# Patient Record
Sex: FEMALE | Race: WHITE | Employment: FULL TIME | URBAN - METROPOLITAN AREA
[De-identification: names, ages, dates, MRNs, and addresses within clinical notes are randomized per-mention and may not be internally consistent; named-entity substitution may affect disease eponyms.]

---

## 2017-05-12 ENCOUNTER — TELEPHONE (OUTPATIENT)
Dept: FAMILY MEDICINE CLINIC | Age: 27
End: 2017-05-12

## 2017-05-12 NOTE — TELEPHONE ENCOUNTER
Chief Complaint   Patient presents with    Chest Pain     Received call from patient that she was experiencing chest pain. Patient denies any SOB. Pt instructed to go to the ER for a full evaluation. Pt verbalizes understanding.

## 2017-05-23 ENCOUNTER — HOSPITAL ENCOUNTER (OUTPATIENT)
Dept: CT IMAGING | Age: 27
Discharge: HOME | End: 2017-05-23
Attending: FAMILY MEDICINE
Payer: COMMERCIAL

## 2017-05-23 ENCOUNTER — TELEPHONE (OUTPATIENT)
Dept: FAMILY MEDICINE CLINIC | Age: 27
End: 2017-05-23

## 2017-05-23 ENCOUNTER — HOSPITAL ENCOUNTER (OUTPATIENT)
Dept: LAB | Age: 27
Discharge: HOME | End: 2017-05-23

## 2017-05-23 ENCOUNTER — HOSPITAL ENCOUNTER (OUTPATIENT)
Dept: GENERAL RADIOLOGY | Age: 27
Discharge: HOME | End: 2017-05-23
Payer: COMMERCIAL

## 2017-05-23 ENCOUNTER — OFFICE VISIT (OUTPATIENT)
Dept: FAMILY MEDICINE CLINIC | Age: 27
End: 2017-05-23

## 2017-05-23 ENCOUNTER — HOSPITAL ENCOUNTER (OUTPATIENT)
Dept: LAB | Age: 27
Discharge: HOME | End: 2017-05-23
Payer: COMMERCIAL

## 2017-05-23 VITALS
TEMPERATURE: 98.8 F | BODY MASS INDEX: 40.22 KG/M2 | RESPIRATION RATE: 16 BRPM | WEIGHT: 256.25 LBS | DIASTOLIC BLOOD PRESSURE: 89 MMHG | HEIGHT: 67 IN | SYSTOLIC BLOOD PRESSURE: 128 MMHG | HEART RATE: 81 BPM

## 2017-05-23 DIAGNOSIS — D72.829 LEUKOCYTOSIS, UNSPECIFIED TYPE: ICD-10-CM

## 2017-05-23 DIAGNOSIS — R07.9 CHEST PAIN: ICD-10-CM

## 2017-05-23 DIAGNOSIS — R05.9 COUGH: ICD-10-CM

## 2017-05-23 DIAGNOSIS — R07.9 CHEST PAIN, UNSPECIFIED TYPE: ICD-10-CM

## 2017-05-23 DIAGNOSIS — E55.9 VITAMIN D DEFICIENCY: ICD-10-CM

## 2017-05-23 DIAGNOSIS — E78.2 MIXED HYPERLIPIDEMIA: ICD-10-CM

## 2017-05-23 DIAGNOSIS — J06.9 ACUTE URI: ICD-10-CM

## 2017-05-23 DIAGNOSIS — R73.03 PREDIABETES: ICD-10-CM

## 2017-05-23 DIAGNOSIS — Z51.81 MEDICATION MONITORING ENCOUNTER: ICD-10-CM

## 2017-05-23 DIAGNOSIS — F41.0 PANIC ATTACK: ICD-10-CM

## 2017-05-23 DIAGNOSIS — R07.9 CHEST PAIN, UNSPECIFIED TYPE: Primary | ICD-10-CM

## 2017-05-23 LAB
ATRIAL RATE: 84 BPM
CALCULATED P AXIS, ECG09: 22 DEGREES
CALCULATED R AXIS, ECG10: 0 DEGREES
CALCULATED T AXIS, ECG11: 9 DEGREES
DIAGNOSIS, 93000: NORMAL
P-R INTERVAL, ECG05: 136 MS
Q-T INTERVAL, ECG07: 378 MS
QRS DURATION, ECG06: 70 MS
QTC CALCULATION (BEZET), ECG08: 446 MS
VENTRICULAR RATE, ECG03: 84 BPM

## 2017-05-23 PROCEDURE — 74011636320 HC RX REV CODE- 636/320: Performed by: FAMILY MEDICINE

## 2017-05-23 PROCEDURE — 99001 SPECIMEN HANDLING PT-LAB: CPT | Performed by: FAMILY MEDICINE

## 2017-05-23 PROCEDURE — 71020 XR CHEST PA LAT: CPT

## 2017-05-23 PROCEDURE — 71275 CT ANGIOGRAPHY CHEST: CPT

## 2017-05-23 RX ORDER — NORGESTIMATE AND ETHINYL ESTRADIOL 7DAYSX3 28
KIT ORAL
COMMUNITY
End: 2021-07-19 | Stop reason: ALTCHOICE

## 2017-05-23 RX ORDER — AMOXICILLIN AND CLAVULANATE POTASSIUM 875; 125 MG/1; MG/1
1 TABLET, FILM COATED ORAL EVERY 12 HOURS
Qty: 20 TAB | Refills: 0 | Status: SHIPPED | OUTPATIENT
Start: 2017-05-23 | End: 2017-06-02

## 2017-05-23 RX ORDER — ALPRAZOLAM 0.5 MG/1
.25-.5 TABLET ORAL
Qty: 10 TAB | Refills: 0 | Status: SHIPPED | OUTPATIENT
Start: 2017-05-23 | End: 2021-07-19 | Stop reason: ALTCHOICE

## 2017-05-23 RX ADMIN — IOPAMIDOL 78 ML: 755 INJECTION, SOLUTION INTRAVENOUS at 20:00

## 2017-05-23 NOTE — PROGRESS NOTES
HISTORY OF PRESENT ILLNESS  Francesco Woods is a 32 y.o. female. Chief Complaint   Patient presents with    Follow-up     seen at urgent care on 5/20/17 for chest discomfort and cough. Still has chest discomfort and cough 'comes and goes'. complains of sinus drainage started 2 weeks ago progressing with cough and sob. Admits to having off and on chest pain, random spreads across the entire chest and under the breasts, some sharp pains and some dull achiness. complains of having an anxiety attack yesterday, sudden onset of nausea, with palpitations, sob  Worried with sudden passing of her father from PE  HPI  Past Medical History:   Diagnosis Date    Abnormal Pap smear, low grade squamous intraepithelial lesion (LGSIL) 02/19/2010    AR (allergic rhinitis)     GERD (gastroesophageal reflux disease)     Hand lesion     right    Menorrhagia     Vagina, candidiasis     Weight gain      Current Outpatient Prescriptions   Medication Sig Dispense Refill    norgestimate-ethinyl estradiol (TRINESSA, 28,) 0.18/0.215/0.25 mg-35 mcg (28) tab Take  by mouth.  omeprazole (PRILOSEC) 40 mg capsule Take 1 Cap by mouth daily. 30 Cap 0    albuterol (PROVENTIL HFA, VENTOLIN HFA, PROAIR HFA) 90 mcg/actuation inhaler Take 1 Puff by inhalation every six (6) hours as needed for Wheezing or Shortness of Breath. 1 Inhaler 3    cetirizine (ZYRTEC) 10 mg tablet Take 10 mg by mouth daily as needed.  MULTI-VITAMIN PO Take  by mouth daily.        Allergies   Allergen Reactions    Clindamycin Shortness of Breath and Itching    Hydroxyzine Itching    Jolivette [Norethindrone (Contraceptive)] Shortness of Breath and Palpitations     Hard to breath    Percocet [Oxycodone-Acetaminophen] Shortness of Breath, Itching and Nausea and Vomiting    Vicodin [Hydrocodone-Acetaminophen] Unknown (comments)    Lorena 28 [Drospirenone-Ethinyl Estradiol] Unknown (comments)       ROS  Visit Vitals    /89 (BP 1 Location: Right arm, BP Patient Position: Sitting)    Pulse 81    Temp 98.8 °F (37.1 °C) (Oral)    Resp 16    Ht 5' 7\" (1.702 m)    Wt 256 lb 4 oz (116.2 kg)    BMI 40.13 kg/m2       Physical Exam  Nursing note and vitals reviewed. Constitutional: She is oriented to person, place, and time. She appears well-developed and well-nourished. No distress. HENT: tm neg  Mouth/Throat: Oropharynx is clear and moist.   Neck: No JVD present. No thyromegaly present. Cardiovascular: Normal rate, regular rhythm and normal heart sounds. Pulmonary/Chest: Effort normal and breath sounds normal. No respiratory distress. She has no wheezes. She has no rales. Musculoskeletal: She exhibits no edema. Lymphadenopathy:     She has no cervical adenopathy. Neurological: She is alert and oriented to person, place, and time. Coordination normal.   Psychiatric: She has a normal mood and affect. Her behavior is normal.     ASSESSMENT and PLAN    ICD-10-CM ICD-9-CM    1. Chest pain, unspecified type R07.9 786.50 EKG, 12 LEAD, INITIAL      XR CHEST PA LAT      CTA CHEST W OR W WO CONT      METABOLIC PANEL, COMPREHENSIVE      CBC WITH AUTOMATED DIFF      MAGNESIUM      VITAMIN B12   2. Leukocytosis, unspecified type D72.829 288.60 XR CHEST PA LAT      CTA CHEST W OR W WO CONT      METABOLIC PANEL, COMPREHENSIVE      CBC WITH AUTOMATED DIFF      TSH 3RD GENERATION      MAGNESIUM      VITAMIN B12      amoxicillin-clavulanate (AUGMENTIN) 875-125 mg per tablet   3. Cough R05 786.2 XR CHEST PA LAT      CTA CHEST W OR W WO CONT      METABOLIC PANEL, COMPREHENSIVE      CBC WITH AUTOMATED DIFF      TSH 3RD GENERATION      MAGNESIUM      VITAMIN B12      amoxicillin-clavulanate (AUGMENTIN) 875-125 mg per tablet   4. Acute URI P73.6 634.2 METABOLIC PANEL, COMPREHENSIVE      CBC WITH AUTOMATED DIFF      MAGNESIUM      VITAMIN B12      amoxicillin-clavulanate (AUGMENTIN) 875-125 mg per tablet   5.  Panic attack F41.0 300.01 ALPRAZolam (XANAX) 0.5 mg tablet METABOLIC PANEL, COMPREHENSIVE      CBC WITH AUTOMATED DIFF      TSH 3RD GENERATION      MAGNESIUM      VITAMIN B12   6. Mixed hyperlipidemia E78.2 272.2 LIPID PANEL      METABOLIC PANEL, COMPREHENSIVE      CBC WITH AUTOMATED DIFF      TSH 3RD GENERATION      MAGNESIUM      VITAMIN B12   7. Prediabetes X88.52 672.60 METABOLIC PANEL, COMPREHENSIVE      CBC WITH AUTOMATED DIFF      TSH 3RD GENERATION      MAGNESIUM      VITAMIN B12      HEMOGLOBIN A1C W/O EAG   8. Vitamin D deficiency N48.8 141.1 METABOLIC PANEL, COMPREHENSIVE      CBC WITH AUTOMATED DIFF      TSH 3RD GENERATION      MAGNESIUM      VITAMIN B12   9. Medication monitoring encounter Z51.81 V58.83 LIPID PANEL      METABOLIC PANEL, COMPREHENSIVE      CBC WITH AUTOMATED DIFF      TSH 3RD GENERATION      MAGNESIUM      VITAMIN B12      HEMOGLOBIN A1C W/O EAG   Visit based of time 55 minutes total,  with more than 50% of the face-to-face visit time spent in counseling on chest pain risk of PE, coordination 0f care ordered CT and getting PA, anxiety, stress, acute uri , it's treatment, prognosis, management advise, plan and follow-up recommendations.

## 2017-05-23 NOTE — PROGRESS NOTES
Chief Complaint   Patient presents with    Follow-up     seen at urgent care on 5/20/17 for chest discomfort and cough. Still has chest discomfort and cough 'comes and goes'. Health Maintenance reviewed    1. Have you been to the ER, urgent care clinic since your last visit? Hospitalized since your last visit? Yes When: 5/17 Where: urgent care Reason for visit: chest pain    2. Have you seen or consulted any other health care providers outside of the [address] [place] [address] since your last visit? Include any pap smears or colon screening.  Yes When: 3/17 Where: gyn Reason for visit: ov

## 2017-05-24 LAB
ALBUMIN SERPL-MCNC: 4.1 G/DL (ref 3.5–5.5)
ALBUMIN/GLOB SERPL: 1.4 {RATIO} (ref 1.2–2.2)
ALP SERPL-CCNC: 83 IU/L (ref 39–117)
ALT SERPL-CCNC: 11 IU/L (ref 0–32)
AST SERPL-CCNC: 9 IU/L (ref 0–40)
BASOPHILS # BLD AUTO: 0 X10E3/UL (ref 0–0.2)
BASOPHILS NFR BLD AUTO: 0 %
BILIRUB SERPL-MCNC: 0.2 MG/DL (ref 0–1.2)
BUN SERPL-MCNC: 7 MG/DL (ref 6–20)
BUN/CREAT SERPL: 11 (ref 9–23)
CALCIUM SERPL-MCNC: 9.5 MG/DL (ref 8.7–10.2)
CHLORIDE SERPL-SCNC: 99 MMOL/L (ref 96–106)
CHOLEST SERPL-MCNC: 205 MG/DL (ref 100–199)
CO2 SERPL-SCNC: 23 MMOL/L (ref 18–29)
CREAT SERPL-MCNC: 0.62 MG/DL (ref 0.57–1)
EOSINOPHIL # BLD AUTO: 0 X10E3/UL (ref 0–0.4)
EOSINOPHIL NFR BLD AUTO: 0 %
ERYTHROCYTE [DISTWIDTH] IN BLOOD BY AUTOMATED COUNT: 13.4 % (ref 12.3–15.4)
GLOBULIN SER CALC-MCNC: 2.9 G/DL (ref 1.5–4.5)
GLUCOSE SERPL-MCNC: 89 MG/DL (ref 65–99)
HBA1C MFR BLD: 6.3 % (ref 4.8–5.6)
HCT VFR BLD AUTO: 37.3 % (ref 34–46.6)
HDLC SERPL-MCNC: 48 MG/DL
HGB BLD-MCNC: 12.4 G/DL (ref 11.1–15.9)
IMM GRANULOCYTES # BLD: 0 X10E3/UL (ref 0–0.1)
IMM GRANULOCYTES NFR BLD: 0 %
INTERPRETATION, 910389: NORMAL
LDLC SERPL CALC-MCNC: 111 MG/DL (ref 0–99)
LYMPHOCYTES # BLD AUTO: 2.9 X10E3/UL (ref 0.7–3.1)
LYMPHOCYTES NFR BLD AUTO: 27 %
MAGNESIUM SERPL-MCNC: 1.7 MG/DL (ref 1.6–2.3)
MCH RBC QN AUTO: 29.7 PG (ref 26.6–33)
MCHC RBC AUTO-ENTMCNC: 33.2 G/DL (ref 31.5–35.7)
MCV RBC AUTO: 89 FL (ref 79–97)
MONOCYTES # BLD AUTO: 0.5 X10E3/UL (ref 0.1–0.9)
MONOCYTES NFR BLD AUTO: 5 %
NEUTROPHILS # BLD AUTO: 7 X10E3/UL (ref 1.4–7)
NEUTROPHILS NFR BLD AUTO: 68 %
PLATELET # BLD AUTO: 396 X10E3/UL (ref 150–379)
POTASSIUM SERPL-SCNC: 4.1 MMOL/L (ref 3.5–5.2)
PROT SERPL-MCNC: 7 G/DL (ref 6–8.5)
RBC # BLD AUTO: 4.17 X10E6/UL (ref 3.77–5.28)
SODIUM SERPL-SCNC: 141 MMOL/L (ref 134–144)
TRIGL SERPL-MCNC: 230 MG/DL (ref 0–149)
TSH SERPL DL<=0.005 MIU/L-ACNC: 1.13 UIU/ML (ref 0.45–4.5)
VIT B12 SERPL-MCNC: 305 PG/ML (ref 211–946)
VLDLC SERPL CALC-MCNC: 46 MG/DL (ref 5–40)
WBC # BLD AUTO: 10.5 X10E3/UL (ref 3.4–10.8)

## 2017-05-24 NOTE — TELEPHONE ENCOUNTER
On call provider: Received call from [facility] with [facility] radiology with access code to radiologist's wet read on patient's chest CTA. Radiologist Dr. Tara Moore reporting no evidence of PE. Confirmed this with review of imaging report in patient's chart. Patient was not contacted. Will forward to PCP for review.

## 2017-06-06 ENCOUNTER — OFFICE VISIT (OUTPATIENT)
Dept: FAMILY MEDICINE CLINIC | Age: 27
End: 2017-06-06

## 2017-06-06 VITALS
OXYGEN SATURATION: 93 % | SYSTOLIC BLOOD PRESSURE: 143 MMHG | BODY MASS INDEX: 40.34 KG/M2 | HEART RATE: 93 BPM | DIASTOLIC BLOOD PRESSURE: 86 MMHG | HEIGHT: 67 IN | TEMPERATURE: 99.2 F | WEIGHT: 257 LBS
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
## 2017-06-06 DIAGNOSIS — R05.9 COUGH: ICD-10-CM

## 2017-06-06 DIAGNOSIS — J02.9 SORE THROAT: Primary | ICD-10-CM

## 2017-06-06 DIAGNOSIS — M54.5 CHRONIC MIDLINE LOW BACK PAIN, WITH SCIATICA PRESENCE UNSPECIFIED: ICD-10-CM

## 2017-06-06 DIAGNOSIS — G89.29 CHRONIC MIDLINE LOW BACK PAIN, WITH SCIATICA PRESENCE UNSPECIFIED: ICD-10-CM

## 2017-06-06 LAB
S PYO AG THROAT QL: NEGATIVE
VALID INTERNAL CONTROL?: YES

## 2017-06-06 RX ORDER — BENZONATATE 200 MG/1
200 CAPSULE ORAL
Qty: 30 CAP | Refills: 0 | Status: SHIPPED | OUTPATIENT
Start: 2017-06-06 | End: 2017-06-13
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
## 2017-06-06 NOTE — PROGRESS NOTES
1. Have you been to the ER, urgent care clinic since your last visit? Hospitalized since your last visit? No    2. Have you seen or consulted any other health care providers outside of the [address] [place] [address] since your last visit? Include any pap smears or colon screening. No    Is someone accompanying this pt? no    Is the patient using any DME equipment during OV? no      Chief Complaint   Patient presents with   Johny Chaney 83     Pt states that she was bit by a tick on 5/22/17. Pt states that she has target spot to abdomen with with itching and raised areas. Pt states that she has been using XIOMARA and was already on ABX. Pt states that she is now having joint pain and does have HX of lyme disease.  Follow-up     Pt states that she is still experiencing symptoms from URI. Pt states that she completed the abx. Pt sttates she still has a cough and congestion.

## 2017-06-06 NOTE — LETTER
NOTIFICATION RETURN TO WORK / SCHOOL 
 
6/6/2017 3:17 PM 
 
Ms. Cecilio De La O 
[address] [place] [address] [facility] [id number] To Whom It May Concern: 
 
Cecilio De La O is currently under the care of Soo Bruce. She will return to work/school on: 6/7/17 If there are questions or concerns please have the patient contact our office.  
 
 
 
Sincerely, 
 
 
Ji Marsh NP

## 2017-06-06 NOTE — MR AVS SNAPSHOT
Visit Information Date & Time Provider Department Dept. Phone Encounter #  
 6/6/2017  1:30 PM Santi Miller NP [facility] Kobi Calderon 77 [id number] Follow-up Instructions Return if symptoms worsen or fail to improve. Upcoming Health Maintenance Date Due Pneumococcal 19-64 Highest Risk (1 of 3 - PCV13) 4/13/2009 DTaP/Tdap/Td series (1 - Tdap) 4/13/2011 INFLUENZA AGE 9 TO ADULT 8/1/2017 PAP AKA CERVICAL CYTOLOGY 7/3/2018 Allergies as of 6/6/2017  Review Complete On: 6/6/2017 By: Santi Miller NP Severity Noted Reaction Type Reactions Clindamycin High 02/22/2011    Shortness of Breath, Itching Hydroxyzine High 02/22/2011    Itching Jolivette [Norethindrone (Contraceptive)] High 12/26/2014    Shortness of Breath, Palpitations Hard to breath Percocet [Oxycodone-acetaminophen] High 02/22/2011    Shortness of Breath, Itching, Nausea and Vomiting Vicodin [Hydrocodone-acetaminophen]  02/21/2011    Unknown (comments) Lorena 28 [Drospirenone-ethinyl Estradiol]  02/21/2011   Intolerance Unknown (comments) Current Immunizations  Never Reviewed No immunizations on file. Not reviewed this visit You Were Diagnosed With   
  
 Codes Comments Sore throat    -  Primary ICD-10-CM: J02.9 ICD-9-CM: 449 Cough     ICD-10-CM: R05 ICD-9-CM: 113. 2 Vitals BP Pulse Temp Height(growth percentile) Weight(growth percentile) LMP  
 143/86 (BP 1 Location: Right arm, BP Patient Position: Sitting) 93 99.2 °F (37.3 °C) (Oral) 5' 7\" (1.702 m) 257 lb (116.6 kg) 06/02/2017 SpO2 BMI OB Status Smoking Status 93% 40.25 kg/m2 Having regular periods Never Smoker BMI and BSA Data Body Mass Index Body Surface Area  
 40.25 kg/m 2 2.35 m 2 Preferred Pharmacy Pharmacy Name Phone [facility] [address] [facility] Your Updated Medication List  
  
 This list is accurate as of: 6/6/17  3:21 PM.  Always use your most recent med list.  
  
  
  
  
 albuterol 90 mcg/actuation inhaler Commonly known as:  PROVENTIL HFA, VENTOLIN HFA, PROAIR HFA Take 1 Puff by inhalation every six (6) hours as needed for Wheezing or Shortness of Breath. ALPRAZolam 0.5 mg tablet Commonly known as:  Vincent Proud Take 0.5-1 Tabs by mouth three (3) times daily as needed for Anxiety. Max Daily Amount: 1.5 mg.  
  
 benzonatate 200 mg capsule Commonly known as:  TESSALON Take 1 Cap by mouth three (3) times daily as needed for Cough for up to 7 days. MULTI-VITAMIN PO Take  by mouth daily. omeprazole 40 mg capsule Commonly known as:  PriLOSEC Take 1 Cap by mouth daily. TRINESSA (28) 0.18/0.215/0.25 mg-35 mcg (28) Tab Generic drug:  norgestimate-ethinyl estradiol Take  by mouth. ZyrTEC 10 mg tablet Generic drug:  cetirizine Take 10 mg by mouth daily as needed. Prescriptions Sent to Pharmacy Refills  
 benzonatate (TESSALON) 200 mg capsule 0 Sig: Take 1 Cap by mouth three (3) times daily as needed for Cough for up to 7 days. Class: Normal  
 Pharmacy: [facility] [address] [place] [address] #: [phone] Route: Oral  
  
We Performed the Following AMB POC RAPID STREP A [83591 CPT(R)] Follow-up Instructions Return if symptoms worsen or fail to improve. Patient Instructions Cough: Care Instructions Your Care Instructions A cough is your body's response to something that bothers your throat or airways. Many things can cause a cough. You might cough because of a cold or the flu, bronchitis, or asthma. Smoking, postnasal drip, allergies, and stomach acid that backs up into your throat also can cause coughs. A cough is a symptom, not a disease. Most coughs stop when the cause, such as a cold, goes away.  You can take a few steps at home to cough less and feel better. Follow-up care is a key part of your treatment and safety. Be sure to make and go to all appointments, and call your doctor if you are having problems. It's also a good idea to know your test results and keep a list of the medicines you take. How can you care for yourself at home? · Drink lots of water and other fluids. This helps thin the mucus and soothes a dry or sore throat. Honey or lemon juice in hot water or tea may ease a dry cough. · Take cough medicine as directed by your doctor. · Prop up your head on pillows to help you breathe and ease a dry cough. · Try cough drops to soothe a dry or sore throat. Cough drops don't stop a cough. Medicine-flavored cough drops are no better than candy-flavored drops or hard candy. · Do not smoke. Avoid secondhand smoke. If you need help quitting, talk to your doctor about stop-smoking programs and medicines. These can increase your chances of quitting for good. When should you call for help? Call 911 anytime you think you may need emergency care. For example, call if: 
· You have severe trouble breathing. Call your doctor now or seek immediate medical care if: 
· You cough up blood. · You have new or worse trouble breathing. · You have a new or higher fever. · You have a new rash. Watch closely for changes in your health, and be sure to contact your doctor if: 
· You cough more deeply or more often, especially if you notice more mucus or a change in the color of your mucus. · You have new symptoms, such as a sore throat, an earache, or sinus pain. · You do not get better as expected. Where can you learn more? Go to http://ford-sweta.info/. Enter D279 in the search box to learn more about \"Cough: Care Instructions. \" Current as of: May 27, 2016 Content Version: 11.2 © 0197-9790 [employer].  Care instructions adapted under license by [employer] (which disclaims liability or warranty for this information). If you have questions about a medical condition or this instruction, always ask your healthcare professional. [address] any warranty or liability for your use of this information. Introducing [facility]! Dear Sandra Malik: Thank you for requesting a [employer] account. Our records indicate that you already have an active [employer] account. You can access your account anytime at [email or web address] Did you know that you can access your hospital and ER discharge instructions at any time in [employer]? You can also review all of your test results from your hospital stay or ER visit. Additional Information If you have questions, please visit the Frequently Asked Questions section of the [employer] website at [email or web address] Remember, [employer] is NOT to be used for urgent needs. For medical emergencies, dial 911. Now available from your iPhone and Android! Please provide this summary of care documentation to your next provider. Your primary care clinician is listed as NELLY LIRIANO. If you have any questions after today's visit, please call [phone].

## 2017-06-06 NOTE — PATIENT INSTRUCTIONS
Cough: Care Instructions  Your Care Instructions  A cough is your body's response to something that bothers your throat or airways. Many things can cause a cough. You might cough because of a cold or the flu, bronchitis, or asthma. Smoking, postnasal drip, allergies, and stomach acid that backs up into your throat also can cause coughs. A cough is a symptom, not a disease. Most coughs stop when the cause, such as a cold, goes away. You can take a few steps at home to cough less and feel better. Follow-up care is a key part of your treatment and safety. Be sure to make and go to all appointments, and call your doctor if you are having problems. It's also a good idea to know your test results and keep a list of the medicines you take. How can you care for yourself at home? · Drink lots of water and other fluids. This helps thin the mucus and soothes a dry or sore throat. Honey or lemon juice in hot water or tea may ease a dry cough. · Take cough medicine as directed by your doctor. · Prop up your head on pillows to help you breathe and ease a dry cough. · Try cough drops to soothe a dry or sore throat. Cough drops don't stop a cough. Medicine-flavored cough drops are no better than candy-flavored drops or hard candy. · Do not smoke. Avoid secondhand smoke. If you need help quitting, talk to your doctor about stop-smoking programs and medicines. These can increase your chances of quitting for good. When should you call for help? Call 911 anytime you think you may need emergency care. For example, call if:  · You have severe trouble breathing. Call your doctor now or seek immediate medical care if:  · You cough up blood. · You have new or worse trouble breathing. · You have a new or higher fever. · You have a new rash.   Watch closely for changes in your health, and be sure to contact your doctor if:  · You cough more deeply or more often, especially if you notice more mucus or a change in the color of your mucus. · You have new symptoms, such as a sore throat, an earache, or sinus pain. · You do not get better as expected. Where can you learn more? Go to http://ford-sweta.info/. Enter D279 in the search box to learn more about \"Cough: Care Instructions. \"  Current as of: May 27, 2016  Content Version: 11.2  © 2008-1614 [employer]. Care instructions adapted under license by [employer] (which disclaims liability or warranty for this information). If you have questions about a medical condition or this instruction, always ask your healthcare professional. [address] any warranty or liability for your use of this information.

## 2017-06-10 ENCOUNTER — HOSPITAL ENCOUNTER (OUTPATIENT)
Dept: LAB | Age: 27
Discharge: HOME | End: 2017-06-10

## 2017-06-10 PROCEDURE — 99001 SPECIMEN HANDLING PT-LAB: CPT | Performed by: INTERNAL MEDICINE

## 2017-08-12 ENCOUNTER — HOSPITAL ENCOUNTER (OUTPATIENT)
Dept: ULTRASOUND IMAGING | Age: 27
Discharge: HOME | End: 2017-08-12
Attending: INTERNAL MEDICINE
Payer: COMMERCIAL

## 2017-08-12 DIAGNOSIS — E04.1 THYROID NODULE: ICD-10-CM

## 2017-08-12 PROCEDURE — 76536 US EXAM OF HEAD AND NECK: CPT

## 2017-09-20 ENCOUNTER — OFFICE VISIT (OUTPATIENT)
Dept: FAMILY MEDICINE CLINIC | Age: 27
End: 2017-09-20

## 2017-09-20 VITALS
DIASTOLIC BLOOD PRESSURE: 78 MMHG | HEART RATE: 83 BPM | SYSTOLIC BLOOD PRESSURE: 122 MMHG | WEIGHT: 264.5 LBS | BODY MASS INDEX: 41.52 KG/M2 | TEMPERATURE: 98.1 F | HEIGHT: 67 IN | RESPIRATION RATE: 20 BRPM

## 2017-09-20 DIAGNOSIS — N89.8 VAGINAL ITCHING: Primary | ICD-10-CM

## 2017-09-20 DIAGNOSIS — N89.8 VAGINAL DISCHARGE: ICD-10-CM

## 2017-09-20 DIAGNOSIS — N89.8 VAGINAL ITCHING: ICD-10-CM

## 2017-09-20 RX ORDER — FLUCONAZOLE 150 MG/1
150 TABLET ORAL DAILY
Qty: 2 TAB | Refills: 0 | Status: SHIPPED | OUTPATIENT
Start: 2017-09-20 | End: 2017-09-22

## 2017-09-20 RX ORDER — ALBUTEROL SULFATE 90 UG/1
1 AEROSOL, METERED RESPIRATORY (INHALATION)
Qty: 1 INHALER | Refills: 3 | Status: SHIPPED | OUTPATIENT
Start: 2017-09-20 | End: 2021-07-02 | Stop reason: SDUPTHER

## 2017-09-20 NOTE — MR AVS SNAPSHOT
Visit Information Date & Time Provider Department Dept. Phone Encounter #  
 9/20/2017  7:30 AM Hilario Plummer NP [address] [id number] Follow-up Instructions Return if symptoms worsen or fail to improve. Upcoming Health Maintenance Date Due Pneumococcal 19-64 Highest Risk (1 of 3 - PCV13) 4/13/2009 DTaP/Tdap/Td series (1 - Tdap) 4/13/2011 INFLUENZA AGE 9 TO ADULT 8/1/2017 PAP AKA CERVICAL CYTOLOGY 7/3/2018 Allergies as of 9/20/2017  Review Complete On: 6/6/2017 By: Hilario Plummer NP Severity Noted Reaction Type Reactions Clindamycin High 02/22/2011    Shortness of Breath, Itching Hydroxyzine High 02/22/2011    Itching Jolivette [Norethindrone (Contraceptive)] High 12/26/2014    Shortness of Breath, Palpitations Hard to breath Percocet [Oxycodone-acetaminophen] High 02/22/2011    Shortness of Breath, Itching, Nausea and Vomiting Vicodin [Hydrocodone-acetaminophen]  02/21/2011    Unknown (comments) Lorena 28 [Drospirenone-ethinyl Estradiol]  02/21/2011   Intolerance Unknown (comments) Current Immunizations  Never Reviewed No immunizations on file. Not reviewed this visit You Were Diagnosed With   
  
 Codes Comments Vaginal itching    -  Primary ICD-10-CM: L29.8 ICD-9-CM: 751. 1 Vaginal discharge     ICD-10-CM: N89.8 ICD-9-CM: 623.5 Vitals BP Pulse Temp Resp Height(growth percentile) Weight(growth percentile) 122/78 (BP 1 Location: Right arm, BP Patient Position: Sitting) 83 98.1 °F (36.7 °C) (Oral) 20 5' 7\" (1.702 m) 264 lb 8 oz (120 kg) BMI OB Status Smoking Status 41.43 kg/m2 Having regular periods Never Smoker BMI and BSA Data Body Mass Index Body Surface Area  
 41.43 kg/m 2 2.38 m 2 Preferred Pharmacy Pharmacy Name Phone [facility] [address] [address] [facility] Your Updated Medication List  
  
   
This list is accurate as of: 9/20/17  8:14 AM.  Always use your most recent med list.  
  
  
  
  
 albuterol 90 mcg/actuation inhaler Commonly known as:  PROVENTIL HFA, VENTOLIN HFA, PROAIR HFA Take 1 Puff by inhalation every six (6) hours as needed for Wheezing or Shortness of Breath. ALPRAZolam 0.5 mg tablet Commonly known as:  Phoebe Misha Take 0.5-1 Tabs by mouth three (3) times daily as needed for Anxiety. Max Daily Amount: 1.5 mg.  
  
 fluconazole 150 mg tablet Commonly known as:  DIFLUCAN Take 1 Tab by mouth daily for 2 doses. FDA advises cautious prescribing of oral fluconazole in pregnancy. MULTI-VITAMIN PO Take  by mouth daily. omeprazole 40 mg capsule Commonly known as:  PriLOSEC Take 1 Cap by mouth daily. TRINESSA (28) 0.18/0.215/0.25 mg-35 mcg (28) Tab Generic drug:  norgestimate-ethinyl estradiol Take  by mouth. ZyrTEC 10 mg tablet Generic drug:  cetirizine Take 10 mg by mouth daily as needed. Prescriptions Sent to Pharmacy Refills  
 fluconazole (DIFLUCAN) 150 mg tablet 0 Sig: Take 1 Tab by mouth daily for 2 doses. FDA advises cautious prescribing of oral fluconazole in pregnancy. Class: Normal  
 Pharmacy: [facility] #: [phone] Route: Oral  
  
Follow-up Instructions Return if symptoms worsen or fail to improve. Patient Instructions Please contact our office if you have any questions about your visit today. Vaginal Yeast Infection: Care Instructions Your Care Instructions A vaginal yeast infection is caused by too many yeast cells in the vagina. This is common in women of all ages. Itching, vaginal discharge and irritation, and other symptoms can bother you. But yeast infections don't often cause other health problems. Some medicines can increase your risk of getting a yeast infection.  These include antibiotics, birth control pills, hormones, and steroids. You may also be more likely to get a yeast infection if you are pregnant, have diabetes, douche, or wear tight clothes. With treatment, most yeast infections get better in 2 to 3 days. Follow-up care is a key part of your treatment and safety. Be sure to make and go to all appointments, and call your doctor if you are having problems. It's also a good idea to know your test results and keep a list of the medicines you take. How can you care for yourself at home? · Take your medicines exactly as prescribed. Call your doctor if you think you are having a problem with your medicine. · Ask your doctor about over-the-counter (OTC) medicines for yeast infections. They may cost less than prescription medicines. If you use an OTC treatment, read and follow all instructions on the label. · Do not use tampons while using a vaginal cream or suppository. The tampons can absorb the medicine. Use pads instead. · Wear loose cotton clothing. Do not wear nylon or other fabric that holds body heat and moisture close to the skin. · Try sleeping without underwear. · Do not scratch. Relieve itching with a cold pack or a cool bath. · Do not wash your vaginal area more than once a day. Use plain water or a mild, unscented soap. Air-dry the vaginal area. · Change out of wet swimsuits after swimming. · Do not have sex until you have finished your treatment. · Do not douche. When should you call for help? Call your doctor now or seek immediate medical care if: 
· You have unexpected vaginal bleeding. · You have new or increased pain in your vagina or pelvis. Watch closely for changes in your health, and be sure to contact your doctor if: 
· You have a fever. · You are not getting better after 2 days. · Your symptoms come back after you finish your medicines. Where can you learn more? Go to http://ford-sweta.info/. Enter I753 in the search box to learn more about \"Vaginal Yeast Infection: Care Instructions. \" Current as of: October 13, 2016 Content Version: 11.3 © 0687-5107 [employer]. Care instructions adapted under license by [employer] (which disclaims liability or warranty for this information). If you have questions about a medical condition or this instruction, always ask your healthcare professional. [place] [address] any warranty or liability for your use of this information. Fluconazole (Diflucan) - (By mouth) Why this medicine is used:  
Prevents and treats fungal infections. Contact a nurse or doctor right away if you have: · Blistering, peeling, red skin rash · Fast, pounding, or uneven heartbeat; lightheadedness or fainting · Dark urine or pale stools, vomiting, loss of appetite · Yellow skin or eyes Common side effects: · Mild nausea, vomiting, stomach pain, diarrhea 
· Headache © 2017 [address]  Information is for End User's use only and may not be sold, redistributed or otherwise used for commercial purposes. Fluconazole (By mouth) Fluconazole ([id number]-JYN-j-zole) Prevents and treats fungal infections. Brand Name(s): Diflucan There may be other brand names for this medicine. When This Medicine Should Not Be Used: This medicine is not right for everyone. Do not use it if you had an allergic reaction to fluconazole, or if you are pregnant. How to Use This Medicine:  
Liquid, Tablet · Your doctor will tell you how much medicine to use. Do not use more than directed. · Oral liquid: Shake well just before each use. Measure the oral liquid medicine with a marked measuring spoon, oral syringe, or medicine cup. · Take all of the medicine in your prescription to clear up your infection, even if you feel better after the first few doses. · Read and follow the patient instructions that come with this medicine. Talk to your doctor or pharmacist if you have any questions. · Missed dose: Take a dose as soon as you remember. If it is almost time for your next dose, wait until then and take a regular dose. Do not take extra medicine to make up for a missed dose. · Store the medicine in a closed container at room temperature, away from heat, moisture, and direct light. Store the oral liquid in the refrigerator or at room temperature and use it within 14 days. Do not freeze. Drugs and Foods to Avoid: Ask your doctor or pharmacist before using any other medicine, including over-the-counter medicines, vitamins, and herbal products. · Do not use this medicine together with astemizole, cisapride, erythromycin, pimozide, quinidine, or terfenadine. · Some foods and medicines can affect how fluconazole works. Tell your doctor if you are using cimetidine, midazolam, prednisone, rifabutin, rifampin, theophylline, tofacitinib, triazolam, vitamin A supplements, or voriconazole. Also tell your doctor if you are using any of the following: ¨ A blood thinner (such as warfarin) ¨ A diuretic or \"water pill\" (such as hydrochlorothiazide), or blood pressure medicine (such as amlodipine, felodipine, isradipine, losartan, nifedipine) ¨ Birth control pills ¨ Cancer medicine (cyclophosphamide, vinblastine, vincristine) ¨ Diabetes medicine that you take by mouth (glipizide, glyburide, tolbutamide) ¨ Medicine to lower cholesterol (atorvastatin, fluvastatin, simvastatin) ¨ Medicine to treat depression (amitriptyline, nortriptyline) ¨ Medicine to treat HIV/AIDS (saquinavir, zidovudine) ¨ Medicine to treat malaria (halofantrine) ¨ Medicine to treat seizures (carbamazepine, phenytoin) ¨ Medicine that weakens the immune system (cyclosporine, sirolimus, tacrolimus) ¨ Narcotic pain medicine (alfentanil, fentanyl, methadone) ¨ Pain or arthritis medicine (aspirin, celecoxib, diclofenac, ibuprofen, naproxen) Warnings While Using This Medicine: · It is not safe to take this medicine during pregnancy. It could harm an unborn baby. Tell your doctor right away if you become pregnant. · Tell your doctor if you are breastfeeding, or if you have kidney disease, liver disease, heart disease, heart rhythm problems, cancer, or HIV/AIDS. · This medicine may cause the following problems:  
¨ Liver problems ¨ Serious skin reactions ¨ Changes in heart rhythm, such as a condition called QT prolongation · This medicine may make you dizzy or drowsy. Do not drive or do anything that could be dangerous until you know how this medicine affects you. · Call your doctor if your symptoms do not improve or if they get worse. · Keep all medicine out of the reach of children. Never share your medicine with anyone. Possible Side Effects While Using This Medicine:  
Call your doctor right away if you notice any of these side effects: · Allergic reaction: Itching or hives, swelling in your face or hands, swelling or tingling in your mouth or throat, chest tightness, trouble breathing · Blistering, peeling, or red skin rash · Dark urine or pale stools, nausea, vomiting, loss of appetite, stomach pain, yellow skin or eyes · Fast, pounding, or uneven heartbeat · Unusual bleeding, bruising, or weakness If you notice these less serious side effects, talk with your doctor:  
· Headache · Mild nausea, vomiting, stomach pain, or diarrhea If you notice other side effects that you think are caused by this medicine, tell your doctor. Call your doctor for medical advice about side effects. You may report side effects to FDA at 3-472-IDI-1908 © 2017 [facility] Information is for End User's use only and may not be sold, redistributed or otherwise used for commercial purposes. The above information is an  only. It is not intended as medical advice for individual conditions or treatments.  Talk to your doctor, nurse or pharmacist before following any medical regimen to see if it is safe and effective for you. Introducing [facility]! Dear Sahil Nelson: Thank you for requesting a Myer account. Our records indicate that you already have an active Myer account. You can access your account anytime at [email or web address]. [email or web address] Did you know that you can access your hospital and ER discharge instructions at any time in Myer? You can also review all of your test results from your hospital stay or ER visit. Additional Information If you have questions, please visit the Frequently Asked Questions section of the Myer website at [email or web address] Remember, Myer is NOT to be used for urgent needs. For medical emergencies, dial 911. Now available from your iPhone and Android! Please provide this summary of care documentation to your next provider. Your primary care clinician is listed as NELLY LIRIANO. If you have any questions after today's visit, please call [phone].

## 2017-09-20 NOTE — PROGRESS NOTES
Chief Complaint   Patient presents with    Yeast Infection     Patient states she had vaginal itching and discharge so she self medicated with OTC med for yeast infection. After 3 day therapy she still has same sx and now area is 'inflamed'.        Health Maintenance Due   Topic Date Due    Pneumococcal 19-64 Highest Risk (1 of 3 - PCV13) 04/13/2009    DTaP/Tdap/Td series (1 - Tdap) 04/13/2011    INFLUENZA AGE 9 TO ADULT  08/01/2017       Health Maintenance reviewed
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
## 2017-09-20 NOTE — PATIENT INSTRUCTIONS
Please contact our office if you have any questions about your visit today. Vaginal Yeast Infection: Care Instructions  Your Care Instructions  A vaginal yeast infection is caused by too many yeast cells in the vagina. This is common in women of all ages. Itching, vaginal discharge and irritation, and other symptoms can bother you. But yeast infections don't often cause other health problems. Some medicines can increase your risk of getting a yeast infection. These include antibiotics, birth control pills, hormones, and steroids. You may also be more likely to get a yeast infection if you are pregnant, have diabetes, douche, or wear tight clothes. With treatment, most yeast infections get better in 2 to 3 days. Follow-up care is a key part of your treatment and safety. Be sure to make and go to all appointments, and call your doctor if you are having problems. It's also a good idea to know your test results and keep a list of the medicines you take. How can you care for yourself at home? · Take your medicines exactly as prescribed. Call your doctor if you think you are having a problem with your medicine. · Ask your doctor about over-the-counter (OTC) medicines for yeast infections. They may cost less than prescription medicines. If you use an OTC treatment, read and follow all instructions on the label. · Do not use tampons while using a vaginal cream or suppository. The tampons can absorb the medicine. Use pads instead. · Wear loose cotton clothing. Do not wear nylon or other fabric that holds body heat and moisture close to the skin. · Try sleeping without underwear. · Do not scratch. Relieve itching with a cold pack or a cool bath. · Do not wash your vaginal area more than once a day. Use plain water or a mild, unscented soap. Air-dry the vaginal area. · Change out of wet swimsuits after swimming. · Do not have sex until you have finished your treatment. · Do not douche.   When should you call for help? Call your doctor now or seek immediate medical care if:  · You have unexpected vaginal bleeding. · You have new or increased pain in your vagina or pelvis. Watch closely for changes in your health, and be sure to contact your doctor if:  · You have a fever. · You are not getting better after 2 days. · Your symptoms come back after you finish your medicines. Where can you learn more? Go to http://ford-sweta.info/. Enter A945 in the search box to learn more about \"Vaginal Yeast Infection: Care Instructions. \"  Current as of: October 13, 2016  Content Version: 11.3  © 1098-9373 [employer]. Care instructions adapted under license by [employer] (which disclaims liability or warranty for this information). If you have questions about a medical condition or this instruction, always ask your healthcare professional. [address] any warranty or liability for your use of this information. Fluconazole (Diflucan) - (By mouth)   Why this medicine is used:   Prevents and treats fungal infections. Contact a nurse or doctor right away if you have:  · Blistering, peeling, red skin rash  · Fast, pounding, or uneven heartbeat; lightheadedness or fainting  · Dark urine or pale stools, vomiting, loss of appetite  · Yellow skin or eyes     Common side effects:  · Mild nausea, vomiting, stomach pain, diarrhea  · Headache  © 2017 [address] [employer] Information is for End User's use only and may not be sold, redistributed or otherwise used for commercial purposes. Fluconazole (By mouth)   Fluconazole (qlsv-PJE-y-zole)  Prevents and treats fungal infections. Brand Name(s): Diflucan   There may be other brand names for this medicine. When This Medicine Should Not Be Used: This medicine is not right for everyone. Do not use it if you had an allergic reaction to fluconazole, or if you are pregnant.   How to Use This Medicine:   Liquid, Tablet  · Your doctor will tell you how much medicine to use. Do not use more than directed. · Oral liquid: Shake well just before each use. Measure the oral liquid medicine with a marked measuring spoon, oral syringe, or medicine cup. · Take all of the medicine in your prescription to clear up your infection, even if you feel better after the first few doses. · Read and follow the patient instructions that come with this medicine. Talk to your doctor or pharmacist if you have any questions. · Missed dose: Take a dose as soon as you remember. If it is almost time for your next dose, wait until then and take a regular dose. Do not take extra medicine to make up for a missed dose. · Store the medicine in a closed container at room temperature, away from heat, moisture, and direct light. Store the oral liquid in the refrigerator or at room temperature and use it within 14 days. Do not freeze. Drugs and Foods to Avoid:   Ask your doctor or pharmacist before using any other medicine, including over-the-counter medicines, vitamins, and herbal products. · Do not use this medicine together with astemizole, cisapride, erythromycin, pimozide, quinidine, or terfenadine. · Some foods and medicines can affect how fluconazole works. Tell your doctor if you are using cimetidine, midazolam, prednisone, rifabutin, rifampin, theophylline, tofacitinib, triazolam, vitamin A supplements, or voriconazole.  Also tell your doctor if you are using any of the following:   ¨ A blood thinner (such as warfarin)  ¨ A diuretic or \"water pill\" (such as hydrochlorothiazide), or blood pressure medicine (such as amlodipine, felodipine, isradipine, losartan, nifedipine)  ¨ Birth control pills  ¨ Cancer medicine (cyclophosphamide, vinblastine, vincristine)  ¨ Diabetes medicine that you take by mouth (glipizide, glyburide, tolbutamide)  ¨ Medicine to lower cholesterol (atorvastatin, fluvastatin, simvastatin)  ¨ Medicine to treat depression (amitriptyline, nortriptyline)  ¨ Medicine to treat HIV/AIDS (saquinavir, zidovudine)  ¨ Medicine to treat malaria (halofantrine)  ¨ Medicine to treat seizures (carbamazepine, phenytoin)  ¨ Medicine that weakens the immune system (cyclosporine, sirolimus, tacrolimus)  ¨ Narcotic pain medicine (alfentanil, fentanyl, methadone)  ¨ Pain or arthritis medicine (aspirin, celecoxib, diclofenac, ibuprofen, naproxen)  Warnings While Using This Medicine:   · It is not safe to take this medicine during pregnancy. It could harm an unborn baby. Tell your doctor right away if you become pregnant. · Tell your doctor if you are breastfeeding, or if you have kidney disease, liver disease, heart disease, heart rhythm problems, cancer, or HIV/AIDS. · This medicine may cause the following problems:   ¨ Liver problems  ¨ Serious skin reactions  ¨ Changes in heart rhythm, such as a condition called QT prolongation  · This medicine may make you dizzy or drowsy. Do not drive or do anything that could be dangerous until you know how this medicine affects you. · Call your doctor if your symptoms do not improve or if they get worse. · Keep all medicine out of the reach of children. Never share your medicine with anyone. Possible Side Effects While Using This Medicine:   Call your doctor right away if you notice any of these side effects:  · Allergic reaction: Itching or hives, swelling in your face or hands, swelling or tingling in your mouth or throat, chest tightness, trouble breathing  · Blistering, peeling, or red skin rash  · Dark urine or pale stools, nausea, vomiting, loss of appetite, stomach pain, yellow skin or eyes  · Fast, pounding, or uneven heartbeat  · Unusual bleeding, bruising, or weakness  If you notice these less serious side effects, talk with your doctor:   · Headache  · Mild nausea, vomiting, stomach pain, or diarrhea  If you notice other side effects that you think are caused by this medicine, tell your doctor. Call your doctor for medical advice about side effects. You may report side effects to FDA at 9-313-WOZ-2615  © 2017 [facility] Information is for End User's use only and may not be sold, redistributed or otherwise used for commercial purposes. The above information is an  only. It is not intended as medical advice for individual conditions or treatments. Talk to your doctor, nurse or pharmacist before following any medical regimen to see if it is safe and effective for you.

## 2017-09-23 LAB
A VAGINAE DNA VAG QL NAA+PROBE: ABNORMAL SCORE
BVAB2 DNA VAG QL NAA+PROBE: ABNORMAL SCORE
C ALBICANS DNA VAG QL NAA+PROBE: POSITIVE
C GLABRATA DNA VAG QL NAA+PROBE: NEGATIVE
C TRACH RRNA SPEC QL NAA+PROBE: NEGATIVE
MEGA1 DNA VAG QL NAA+PROBE: ABNORMAL SCORE
N GONORRHOEA RRNA SPEC QL NAA+PROBE: NEGATIVE
T VAGINALIS RRNA SPEC QL NAA+PROBE: NEGATIVE

## 2017-09-25 NOTE — PROGRESS NOTES
Please contact patient and inform her that her Nu Swab was positive for yeast. We have already treated her for this.  All other results were negative or normal. LUIS E [place]

## 2017-12-20 ENCOUNTER — OFFICE VISIT (OUTPATIENT)
Dept: ONCOLOGY | Age: 27
End: 2017-12-20

## 2017-12-20 ENCOUNTER — HOSPITAL ENCOUNTER (OUTPATIENT)
Dept: ONCOLOGY | Age: 27
Discharge: HOME | End: 2017-12-20

## 2017-12-20 VITALS
DIASTOLIC BLOOD PRESSURE: 90 MMHG | WEIGHT: 262.8 LBS | BODY MASS INDEX: 38.92 KG/M2 | SYSTOLIC BLOOD PRESSURE: 151 MMHG | TEMPERATURE: 98.7 F | HEIGHT: 69 IN | HEART RATE: 87 BPM

## 2017-12-20 DIAGNOSIS — D75.1 POLYCYTHEMIA: ICD-10-CM

## 2017-12-20 DIAGNOSIS — D75.1 ERYTHROCYTOSIS: Primary | ICD-10-CM

## 2017-12-20 DIAGNOSIS — D75.1 ERYTHROCYTOSIS: ICD-10-CM

## 2017-12-20 LAB
BASO+EOS+MONOS # BLD AUTO: 0.5 K/UL (ref 0–2.3)
BASO+EOS+MONOS # BLD AUTO: 6 % (ref 0.1–17)
DIFFERENTIAL METHOD BLD: NORMAL
ERYTHROCYTE [DISTWIDTH] IN BLOOD BY AUTOMATED COUNT: 12.1 % (ref 11.5–14.5)
HCT VFR BLD AUTO: 40.5 % (ref 36–48)
HGB BLD-MCNC: 13.1 G/DL (ref 12–16)
LYMPHOCYTES # BLD: 2.6 K/UL (ref 1.1–5.9)
LYMPHOCYTES NFR BLD: 32 % (ref 14–44)
MCH RBC QN AUTO: 29.8 PG (ref 25–35)
MCHC RBC AUTO-ENTMCNC: 32.3 G/DL (ref 31–37)
MCV RBC AUTO: 92.3 FL (ref 78–102)
NEUTS SEG # BLD: 5.2 K/UL (ref 1.8–9.5)
NEUTS SEG NFR BLD: 62 % (ref 40–70)
PLATELET # BLD AUTO: 378 K/UL (ref 140–440)
RBC # BLD AUTO: 4.39 M/UL (ref 4.1–5.1)
WBC # BLD AUTO: 8.3 K/UL (ref 4.5–13)
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
## 2017-12-20 NOTE — MR AVS SNAPSHOT
Visit Information Date & Time Provider Department Dept. Phone Encounter #  
 12/20/2017 11:00 AM Rima Kim MD [facility] [phone] [id number] Follow-up Instructions Return in about 2 weeks (around 1/3/2018). Your Appointments 1/3/2018 11:15 AM  
Office Visit with Rima Kim MD  
[address] [place] [address] Oncology [address] [facility]) Appt Note: 2 week results [address] [place] [address] [address] [place] [address], [address] [place] [address] Upcoming Health Maintenance Date Due Pneumococcal 19-64 Highest Risk (1 of 3 - PCV13) 4/13/2009 DTaP/Tdap/Td series (1 - Tdap) 4/13/2011 Influenza Age 5 to Adult 8/1/2017 PAP AKA CERVICAL CYTOLOGY 7/3/2018 Allergies as of 12/20/2017  Review Complete On: 9/20/2017 By: Marcy Solomon NP Severity Noted Reaction Type Reactions Clindamycin High 02/22/2011    Shortness of Breath, Itching Hydroxyzine High 02/22/2011    Itching Jolivette [Norethindrone (Contraceptive)] High 12/26/2014    Shortness of Breath, Palpitations Hard to breath Percocet [Oxycodone-acetaminophen] High 02/22/2011    Shortness of Breath, Itching, Nausea and Vomiting Vicodin [Hydrocodone-acetaminophen]  02/21/2011    Unknown (comments) Lorena 28 [Drospirenone-ethinyl Estradiol]  02/21/2011   Intolerance Unknown (comments) Current Immunizations  Never Reviewed No immunizations on file. Not reviewed this visit You Were Diagnosed With   
  
 Codes Comments Erythrocytosis    -  Primary ICD-10-CM: D75.1 ICD-9-CM: 289.0 Polycythemia     ICD-10-CM: D75.1 ICD-9-CM: 238.4 Vitals  BP Pulse Temp Height(growth percentile) Weight(growth percentile) BMI  
 151/90 (BP 1 Location: Right arm, BP Patient Position: Sitting) 87 98.7 °F (37.1 °C) (Oral) 5' 9\" (1.753 m) 262 lb 12.8 oz (119.2 kg) 38.81 kg/m2 OB Status Smoking Status Having regular periods Never Smoker Vitals History BMI and BSA Data Body Mass Index Body Surface Area  
 38.81 kg/m 2 2.41 m 2 Preferred Pharmacy Pharmacy Name Phone [facility] Your Updated Medication List  
  
   
This list is accurate as of: 12/20/17 12:32 PM.  Always use your most recent med list.  
  
  
  
  
 albuterol 90 mcg/actuation inhaler Commonly known as:  PROVENTIL HFA, VENTOLIN HFA, PROAIR HFA Take 1 Puff by inhalation every six (6) hours as needed for Wheezing or Shortness of Breath. ALPRAZolam 0.5 mg tablet Commonly known as:  [place] Curio Take 0.5-1 Tabs by mouth three (3) times daily as needed for Anxiety. Max Daily Amount: 1.5 mg. MULTI-VITAMIN PO Take  by mouth daily. omeprazole 40 mg capsule Commonly known as:  PriLOSEC Take 1 Cap by mouth daily. TRINESSA (28) 0.18/0.215/0.25 mg-35 mcg (28) Tab Generic drug:  norgestimate-ethinyl estradiol Take  by mouth. ZyrTEC 10 mg tablet Generic drug:  cetirizine Take 10 mg by mouth daily as needed. We Performed the Following COMPLETE CBC & AUTO DIFF WBC [95839 CPT(R)] FERRITIN [77871 CPT(R)] IRON PROFILE [id number] CPT(R)] JAK2 MUTATION ANALYSIS [[id number] Custom] METABOLIC PANEL, COMPREHENSIVE [23332 CPT(R)] Follow-up Instructions Return in about 2 weeks (around 1/3/2018). To-Do List   
 12/20/2017 Lab:  CBC WITH 3 PART DIFF Patient Instructions Polycythemia: Care Instructions Your Care Instructions Polycythemia (say \"paw-nia-sy-THEE-lillie-uh) is an abnormal increase in red blood cells. It happens when the tissue inside your bones (bone marrow) makes too much blood.  It also can occur if your blood does not have enough liquid, or plasma. This can make the number of red blood cells seem higher than normal. The extra red blood cells make your blood thicker than normal. This may raise your risk for blood clots that can cause heart attacks or strokes. Clots can form in the deep veins of the body, a condition called deep vein thrombosis. Or, a clot can travel through the blood to a lung (a pulmonary embolism). Your doctor may treat you by taking out some of your blood (phlebotomy). The process is like donating blood. Your doctor may even recommend that you donate blood. You may take pills to stop your body from making red blood cells. You also will get treatment for any other conditions that may cause your body to make too many red blood cells. Follow-up care is a key part of your treatment and safety. Be sure to make and go to all appointments, and call your doctor if you are having problems. It's also a good idea to know your test results and keep a list of the medicines you take. How can you care for yourself at home? · Be safe with medicines. Take your medicines exactly as prescribed. Call your doctor if you think you are having a problem with your medicine. · Drink plenty of fluids, enough so that your urine is light yellow or clear like water, before and after you have blood removed. If you have kidney, heart, or liver disease and have to limit fluids, talk with your doctor before you increase the amount of fluids you drink. · Take it easy after you have had blood removed. Do not do vigorous exercise. · If your doctor recommends aspirin, take it exactly as prescribed. Call your doctor if you think you are having a problem with your medicine. · Do not smoke. Smoking increases the risk of blood clots and may reduce the amount of oxygen in your blood. If you need help quitting, talk to your doctor about stop-smoking programs and medicines. These can increase your chances of quitting for good. · Take an antihistamine, such as a nondrowsy one like loratadine (Claritin) or one that might make you sleepy like diphenhydramine (Benadryl), if your skin is itchy. Some people who have this condition have itching. · Wear medical alert jewelry that lists your clotting problem. You can buy this at most drugstores. When should you call for help? Call 911 anytime you think you may need emergency care. For example, call if: 
? · You have sudden chest pain and shortness of breath, or you cough up blood. ? · You have symptoms of a stroke. These may include: 
¨ Sudden numbness, tingling, weakness, or loss of movement in your face, arm, or leg, especially on only one side of your body. ¨ Sudden vision changes. ¨ Sudden trouble speaking. ¨ Sudden confusion or trouble understanding simple statements. ¨ Sudden problems with walking or balance. ¨ A sudden, severe headache that is different from past headaches. ? · You have symptoms of a heart attack. These may include: ¨ Chest pain or pressure, or a strange feeling in the chest. 
¨ Sweating. ¨ Shortness of breath. ¨ Nausea or vomiting. ¨ Pain, pressure, or a strange feeling in the back, neck, jaw, or upper belly or in one or both shoulders or arms. ¨ Lightheadedness or sudden weakness. ¨ A fast or irregular heartbeat. After you call 911, the  may tell you to chew 1 adult-strength or 2 to 4 low-dose aspirin. Wait for an ambulance. Do not try to drive yourself. ?Call your doctor now or seek immediate medical care if: 
? · You have signs of a blood clot, such as: 
¨ Pain in your calf, back of knee, thigh, or groin. ¨ Redness and swelling in your leg or groin. ? Watch closely for changes in your health, and be sure to contact your doctor if you have any problems. Where can you learn more? Go to http://ford-sweta.info/. Enter R822 in the search box to learn more about \"Polycythemia: Care Instructions. \" 
 Current as of: October 13, 2016 Content Version: 11.4 © 8541-2474 Healthwise, [employer]. Care instructions adapted under license by [employer] (which disclaims liability or warranty for this information). If you have questions about a medical condition or this instruction, always ask your healthcare professional. [address] any warranty or liability for your use of this information. Introducing [facility]! Dear Vincent Ward: Thank you for requesting a Open[employer] account. Our records indicate that you already have an active OpenSilo account. You can access your account anytime at [email or web address] "[email or web address] Did you know that you can access your hospital and ER discharge instructions at any time in OpenSilo? You can also review all of your test results from your hospital stay or ER visit. Additional Information If you have questions, please visit the Frequently Asked Questions section of the Open[employer]o website at [email or web address] Remember, OpenSilo is NOT to be used for urgent needs. For medical emergencies, dial 911. Now available from your iPhone and Android! Please provide this summary of care documentation to your next provider. Your primary care clinician is listed as NELLY LIRIANO. If you have any questions after today's visit, please call [phone].

## 2017-12-20 NOTE — PATIENT INSTRUCTIONS
Polycythemia: Care Instructions  Your Care Instructions    Polycythemia (say \"paw-nia-sy-THEE-lillie-uh) is an abnormal increase in red blood cells. It happens when the tissue inside your bones (bone marrow) makes too much blood. It also can occur if your blood does not have enough liquid, or plasma. This can make the number of red blood cells seem higher than normal. The extra red blood cells make your blood thicker than normal. This may raise your risk for blood clots that can cause heart attacks or strokes. Clots can form in the deep veins of the body, a condition called deep vein thrombosis. Or, a clot can travel through the blood to a lung (a pulmonary embolism). Your doctor may treat you by taking out some of your blood (phlebotomy). The process is like donating blood. Your doctor may even recommend that you donate blood. You may take pills to stop your body from making red blood cells. You also will get treatment for any other conditions that may cause your body to make too many red blood cells. Follow-up care is a key part of your treatment and safety. Be sure to make and go to all appointments, and call your doctor if you are having problems. It's also a good idea to know your test results and keep a list of the medicines you take. How can you care for yourself at home? · Be safe with medicines. Take your medicines exactly as prescribed. Call your doctor if you think you are having a problem with your medicine. · Drink plenty of fluids, enough so that your urine is light yellow or clear like water, before and after you have blood removed. If you have kidney, heart, or liver disease and have to limit fluids, talk with your doctor before you increase the amount of fluids you drink. · Take it easy after you have had blood removed. Do not do vigorous exercise. · If your doctor recommends aspirin, take it exactly as prescribed.  Call your doctor if you think you are having a problem with your medicine. · Do not smoke. Smoking increases the risk of blood clots and may reduce the amount of oxygen in your blood. If you need help quitting, talk to your doctor about stop-smoking programs and medicines. These can increase your chances of quitting for good. · Take an antihistamine, such as a nondrowsy one like loratadine (Claritin) or one that might make you sleepy like diphenhydramine (Benadryl), if your skin is itchy. Some people who have this condition have itching. · Wear medical alert jewelry that lists your clotting problem. You can buy this at most drugstores. When should you call for help? Call 911 anytime you think you may need emergency care. For example, call if:  ? · You have sudden chest pain and shortness of breath, or you cough up blood. ? · You have symptoms of a stroke. These may include:  ¨ Sudden numbness, tingling, weakness, or loss of movement in your face, arm, or leg, especially on only one side of your body. ¨ Sudden vision changes. ¨ Sudden trouble speaking. ¨ Sudden confusion or trouble understanding simple statements. ¨ Sudden problems with walking or balance. ¨ A sudden, severe headache that is different from past headaches. ? · You have symptoms of a heart attack. These may include:  ¨ Chest pain or pressure, or a strange feeling in the chest.  ¨ Sweating. ¨ Shortness of breath. ¨ Nausea or vomiting. ¨ Pain, pressure, or a strange feeling in the back, neck, jaw, or upper belly or in one or both shoulders or arms. ¨ Lightheadedness or sudden weakness. ¨ A fast or irregular heartbeat. After you call 911, the  may tell you to chew 1 adult-strength or 2 to 4 low-dose aspirin. Wait for an ambulance. Do not try to drive yourself. ?Call your doctor now or seek immediate medical care if:  ? · You have signs of a blood clot, such as:  ¨ Pain in your calf, back of knee, thigh, or groin. ¨ Redness and swelling in your leg or groin. ? Watch closely for changes in your health, and be sure to contact your doctor if you have any problems. Where can you learn more? Go to http://ford-sweta.info/. Enter J561 in the search box to learn more about \"Polycythemia: Care Instructions. \"  Current as of: October 13, 2016  Content Version: 11.4  © 1618-2449 [employer]. Care instructions adapted under license by [employer] (which disclaims liability or warranty for this information). If you have questions about a medical condition or this instruction, always ask your healthcare professional. [address] any warranty or liability for your use of this information.

## 2017-12-26 LAB
ALBUMIN SERPL-MCNC: 4.1 G/DL (ref 3.5–5.5)
ALBUMIN/GLOB SERPL: 1.4 {RATIO} (ref 1.2–2.2)
ALP SERPL-CCNC: 97 IU/L (ref 39–117)
ALT SERPL-CCNC: 23 IU/L (ref 0–32)
AST SERPL-CCNC: 12 IU/L (ref 0–40)
BACKGROUND: 480503: NORMAL
BILIRUB SERPL-MCNC: <0.2 MG/DL (ref 0–1.2)
BUN SERPL-MCNC: 7 MG/DL (ref 6–20)
BUN/CREAT SERPL: 11 (ref 9–23)
CALCIUM SERPL-MCNC: 9.4 MG/DL (ref 8.7–10.2)
CHLORIDE SERPL-SCNC: 102 MMOL/L (ref 96–106)
CO2 SERPL-SCNC: 24 MMOL/L (ref 18–29)
CREAT SERPL-MCNC: 0.61 MG/DL (ref 0.57–1)
FERRITIN SERPL-MCNC: 93 NG/ML (ref 15–150)
GLOBULIN SER CALC-MCNC: 2.9 G/DL (ref 1.5–4.5)
GLUCOSE SERPL-MCNC: 83 MG/DL (ref 65–99)
IRON SATN MFR SERPL: 14 % (ref 15–55)
IRON SERPL-MCNC: 47 UG/DL (ref 27–159)
JAK2 P.V617F BLD/T QL: NORMAL
LAB DIRECTOR NAME PROVIDER: NORMAL
POTASSIUM SERPL-SCNC: 4.5 MMOL/L (ref 3.5–5.2)
PROT SERPL-MCNC: 7 G/DL (ref 6–8.5)
SODIUM SERPL-SCNC: 139 MMOL/L (ref 134–144)
TIBC SERPL-MCNC: 341 UG/DL (ref 250–450)
UIBC SERPL-MCNC: 294 UG/DL (ref 131–425)

## 2017-12-27 ENCOUNTER — HOSPITAL ENCOUNTER (EMERGENCY)
Age: 27
Discharge: HOME | End: 2017-12-27
Attending: EMERGENCY MEDICINE
Payer: COMMERCIAL

## 2017-12-27 VITALS
HEART RATE: 83 BPM | OXYGEN SATURATION: 97 % | TEMPERATURE: 97.8 F | BODY MASS INDEX: 38.4 KG/M2 | DIASTOLIC BLOOD PRESSURE: 78 MMHG | WEIGHT: 260 LBS | SYSTOLIC BLOOD PRESSURE: 145 MMHG | RESPIRATION RATE: 14 BRPM

## 2017-12-27 DIAGNOSIS — M25.562 ACUTE PAIN OF BOTH KNEES: Primary | ICD-10-CM

## 2017-12-27 DIAGNOSIS — M25.552 PAIN OF BOTH HIP JOINTS: ICD-10-CM

## 2017-12-27 DIAGNOSIS — M25.561 ACUTE PAIN OF BOTH KNEES: Primary | ICD-10-CM

## 2017-12-27 DIAGNOSIS — M25.551 PAIN OF BOTH HIP JOINTS: ICD-10-CM

## 2017-12-27 LAB
ALBUMIN SERPL-MCNC: 3.4 G/DL (ref 3.4–5)
ALBUMIN/GLOB SERPL: 0.7 {RATIO} (ref 0.8–1.7)
ALP SERPL-CCNC: 105 U/L (ref 45–117)
ALT SERPL-CCNC: 33 U/L (ref 13–56)
ANION GAP SERPL CALC-SCNC: 9 MMOL/L (ref 3–18)
AST SERPL-CCNC: 17 U/L (ref 15–37)
BASOPHILS # BLD: 0 K/UL (ref 0–0.1)
BASOPHILS NFR BLD: 0 % (ref 0–2)
BILIRUB SERPL-MCNC: 0.2 MG/DL (ref 0.2–1)
BUN SERPL-MCNC: 7 MG/DL (ref 7–18)
BUN/CREAT SERPL: 11 (ref 12–20)
CALCIUM SERPL-MCNC: 9 MG/DL (ref 8.5–10.1)
CHLORIDE SERPL-SCNC: 102 MMOL/L (ref 100–108)
CO2 SERPL-SCNC: 27 MMOL/L (ref 21–32)
CREAT SERPL-MCNC: 0.64 MG/DL (ref 0.6–1.3)
D DIMER PPP FEU-MCNC: 0.5 UG/ML(FEU)
DIFFERENTIAL METHOD BLD: ABNORMAL
EOSINOPHIL # BLD: 0.1 K/UL (ref 0–0.4)
EOSINOPHIL NFR BLD: 1 % (ref 0–5)
ERYTHROCYTE [DISTWIDTH] IN BLOOD BY AUTOMATED COUNT: 12.8 % (ref 11.6–14.5)
GLOBULIN SER CALC-MCNC: 4.7 G/DL (ref 2–4)
GLUCOSE SERPL-MCNC: 86 MG/DL (ref 74–99)
HCG SERPL QL: NEGATIVE
HCT VFR BLD AUTO: 41.5 % (ref 35–45)
HGB BLD-MCNC: 13.6 G/DL (ref 12–16)
LYMPHOCYTES # BLD: 2.6 K/UL (ref 0.9–3.6)
LYMPHOCYTES NFR BLD: 40 % (ref 21–52)
MCH RBC QN AUTO: 29.1 PG (ref 24–34)
MCHC RBC AUTO-ENTMCNC: 32.8 G/DL (ref 31–37)
MCV RBC AUTO: 88.9 FL (ref 74–97)
MONOCYTES # BLD: 0.8 K/UL (ref 0.05–1.2)
MONOCYTES NFR BLD: 12 % (ref 3–10)
NEUTS SEG # BLD: 3 K/UL (ref 1.8–8)
NEUTS SEG NFR BLD: 47 % (ref 40–73)
PLATELET # BLD AUTO: 362 K/UL (ref 135–420)
PMV BLD AUTO: 10 FL (ref 9.2–11.8)
POTASSIUM SERPL-SCNC: 3.7 MMOL/L (ref 3.5–5.5)
PROT SERPL-MCNC: 8.1 G/DL (ref 6.4–8.2)
RBC # BLD AUTO: 4.67 M/UL (ref 4.2–5.3)
SODIUM SERPL-SCNC: 138 MMOL/L (ref 136–145)
TSH SERPL DL<=0.05 MIU/L-ACNC: 0.61 UIU/ML (ref 0.36–3.74)
WBC # BLD AUTO: 6.5 K/UL (ref 4.6–13.2)

## 2017-12-27 PROCEDURE — 80053 COMPREHEN METABOLIC PANEL: CPT | Performed by: EMERGENCY MEDICINE

## 2017-12-27 PROCEDURE — 93970 EXTREMITY STUDY: CPT

## 2017-12-27 PROCEDURE — 85379 FIBRIN DEGRADATION QUANT: CPT | Performed by: EMERGENCY MEDICINE

## 2017-12-27 PROCEDURE — 85025 COMPLETE CBC W/AUTO DIFF WBC: CPT | Performed by: EMERGENCY MEDICINE

## 2017-12-27 PROCEDURE — 84443 ASSAY THYROID STIM HORMONE: CPT | Performed by: EMERGENCY MEDICINE

## 2017-12-27 PROCEDURE — 99283 EMERGENCY DEPT VISIT LOW MDM: CPT

## 2017-12-27 PROCEDURE — 84703 CHORIONIC GONADOTROPIN ASSAY: CPT | Performed by: EMERGENCY MEDICINE

## 2017-12-27 NOTE — PROCEDURES
[facility]  [facility] ***    Name: Devante Slaughter  MRN: [id number]  : 1990  HIS Order #: [id number]  [address] [facility] [place] #: [id number]  Date: 27 Dec 2017    TYPE OF TEST: Peripheral Venous Testing    REASON FOR TEST  Pain in limb, Limb swelling    Right Leg:-  Deep venous thrombosis:           No  Superficial venous thrombosis:    No  Deep venous insufficiency:        Not examined  Superficial venous insufficiency: Not examined    Left Leg:-  Deep venous thrombosis:           No  Superficial venous thrombosis:    No  Deep venous insufficiency:        Not examined  Superficial venous insufficiency: Not examined      INTERPRETATION/FINDINGS  Duplex images were obtained using 2-D gray scale, color flow, and  spectral Doppler analysis. Right leg :  1. No evidence of deep venous thrombosis detected in the veins  visualized. 2. Deep vein(s) visualized include the common femoral, femoral,  popliteal, posterior tibial, and peroneal veins. 3. No evidence of superficial thrombosis detected. 4. Superficial vein(s) visualized include the great saphenous vein at  the sapheno-femoral junction. Left leg :  1. No evidence of deep venous thrombosis detected in the veins  visualized. 2. Deep vein(s) visualized include the common femoral, femoral,  popliteal, posterior tibial, and peroneal veins. 3. No evidence of superficial thrombosis detected. 4. Superficial vein(s) visualized include the great saphenous vein at  the sapheno-femoral junction. ADDITIONAL COMMENTS    I have personally reviewed the data relevant to the interpretation of  this  study. TECHNOLOGIST: Niya Pereira. Shyam BROWN  Signed: 2017 05:15 PM    PHYSICIAN: Claudia Pierce D.O.   Signed: 2017 03:35 PM
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
## 2017-12-27 NOTE — DISCHARGE INSTRUCTIONS
Joint Pain: Care Instructions  Your Care Instructions    Many people have small aches and pains from overuse or injury to muscles and joints. Joint injuries often happen during sports or recreation, work tasks, or projects around the home. An overuse injury can happen when you put too much stress on a joint or when you do an activity that stresses the joint over and over, such as using the computer or rowing a boat. You can take action at home to help your muscles and joints get better. You should feel better in 1 to 2 weeks, but it can take 3 months or more to heal completely. Follow-up care is a key part of your treatment and safety. Be sure to make and go to all appointments, and call your doctor if you are having problems. It's also a good idea to know your test results and keep a list of the medicines you take. How can you care for yourself at home? · Do not put weight on the injured joint for at least a day or two. · For the first day or two after an injury, do not take hot showers or baths, and do not use hot packs. The heat could make swelling worse. · Put ice or a cold pack on the sore joint for 10 to 20 minutes at a time. Try to do this every 1 to 2 hours for the next 3 days (when you are awake) or until the swelling goes down. Put a thin cloth between the ice and your skin. · Wrap the injury in an elastic bandage. Do not wrap it too tightly because this can cause more swelling. · Prop up the sore joint on a pillow when you ice it or anytime you sit or lie down during the next 3 days. Try to keep it above the level of your heart. This will help reduce swelling. · Take an over-the-counter pain medicine, such as acetaminophen (Tylenol), ibuprofen (Advil, Motrin), or naproxen (Aleve). Read and follow all instructions on the label. · After 1 or 2 days of rest, begin moving the joint gently.  While the joint is still healing, you can begin to exercise using activities that do not strain or hurt the painful joint. When should you call for help? Call your doctor now or seek immediate medical care if:  ? · You have signs of infection, such as:  ¨ Increased pain, swelling, warmth, and redness. ¨ Red streaks leading from the joint. ¨ A fever. ? Watch closely for changes in your health, and be sure to contact your doctor if:  ? · Your movement or symptoms are not getting better after 1 to 2 weeks of home treatment. Where can you learn more? Go to http://ford-sweta.info/. Enter P205 in the search box to learn more about \"Joint Pain: Care Instructions. \"  Current as of: March 21, 2017  Content Version: 11.4  © 0459-6059 [employer]. Care instructions adapted under license by [employer] (which disclaims liability or warranty for this information). If you have questions about a medical condition or this instruction, always ask your healthcare professional. [address] any warranty or liability for your use of this information.

## 2017-12-27 NOTE — ED NOTES
I have reviewed discharge instructions with the patient. The patient verbalized understanding. Patient armband removed and shredded. Patient ambulatory at this time. Patient's pain has improved some at  This time.

## 2017-12-27 NOTE — ED NOTES
Hx cushing. Hx Polycythemia    Hypertensive tachycardia with palp.      Sent to main ED for continued eval. Elizabeth Browne MD

## 2017-12-27 NOTE — ED NOTES
Patient assessment completed call bell within reach. Warm blankets provided. No additional wants noted at this time. Will continue to monitor patient at this time.

## 2017-12-27 NOTE — LETTER
NOTIFICATION RETURN TO WORK / SCHOOL 
 
12/27/2017 5:53 PM 
 
Ms. Patito Rasheed 
[address] [place] [address] [place] [id number] To Whom It May Concern: 
 
Patito Rasheed is currently under the care of [facility]. She will return to work/school on: 12.28.17 If there are questions or concerns please have the patient contact our office.  
 
 
 
Sincerely, 
 
 
LEONILA Marquez

## 2018-01-03 ENCOUNTER — OFFICE VISIT (OUTPATIENT)
Dept: ONCOLOGY | Age: 28
End: 2018-01-03

## 2018-01-03 VITALS
WEIGHT: 263.6 LBS | DIASTOLIC BLOOD PRESSURE: 88 MMHG | HEIGHT: 69 IN | TEMPERATURE: 98.3 F | SYSTOLIC BLOOD PRESSURE: 143 MMHG | HEART RATE: 102 BPM | BODY MASS INDEX: 39.04 KG/M2

## 2018-01-03 DIAGNOSIS — D75.1 ERYTHROCYTOSIS: Primary | ICD-10-CM

## 2018-01-03 NOTE — PROGRESS NOTES
Hematology/medical oncology progress note    1/3/2018  Jamarcus Edwards  YOB: 1990    PCP: Dr. Jerman Winkler     Diagnosis: Reactive erythrocytosis    Mrs. Cindy Machado is a 80-year-old woman who was referred for evaluation of erythrocytosis to rule out polycythemia. I informed the patient that on 12/27/2017 his CBC revealed a normal WBC count 6.5, hemoglobin 13.6 g/dL, hematocrit 41.5%, and a platelet count of 414,597. Her platelets and hemoglobin hematocrit plus WBC counts were all normal.  Nonetheless a JA K2 mutation analysis to assess for a myelo proliferative disorder such as P vera was obtained. The test was negative. Therefore the findings are consistent with reactive erythrocytosis currently with a normal hemoglobin, hematocrit, and platelet count. I have advised the patient that no additional tests are warranted or recommended. I will recheck her blood in about 3 months to see how well her hemoglobin hematocrit are remaining in the normal range. She had her questions answered to her satisfaction. Total time 25 minutes, greater than 50% of the time was in counseling and coordination of care. Serg Lindo MD, Tania Gama

## 2018-04-04 ENCOUNTER — HOSPITAL ENCOUNTER (OUTPATIENT)
Dept: GENERAL RADIOLOGY | Age: 28
Discharge: HOME | End: 2018-04-04
Payer: COMMERCIAL

## 2018-04-04 ENCOUNTER — OFFICE VISIT (OUTPATIENT)
Dept: FAMILY MEDICINE CLINIC | Age: 28
End: 2018-04-04

## 2018-04-04 VITALS
SYSTOLIC BLOOD PRESSURE: 140 MMHG | OXYGEN SATURATION: 99 % | HEIGHT: 69 IN | RESPIRATION RATE: 17 BRPM | TEMPERATURE: 99.3 F | BODY MASS INDEX: 39.55 KG/M2 | HEART RATE: 86 BPM | WEIGHT: 267 LBS | DIASTOLIC BLOOD PRESSURE: 89 MMHG

## 2018-04-04 DIAGNOSIS — R10.12 LUQ ABDOMINAL PAIN: Primary | ICD-10-CM

## 2018-04-04 DIAGNOSIS — R06.02 SHORTNESS OF BREATH: ICD-10-CM

## 2018-04-04 DIAGNOSIS — R10.12 LUQ ABDOMINAL PAIN: ICD-10-CM

## 2018-04-04 PROCEDURE — 71046 X-RAY EXAM CHEST 2 VIEWS: CPT

## 2018-04-04 PROCEDURE — 74019 RADEX ABDOMEN 2 VIEWS: CPT

## 2018-04-04 RX ORDER — MULTIVITAMIN
TABLET ORAL
COMMUNITY
End: 2021-07-19 | Stop reason: ALTCHOICE

## 2018-04-04 RX ORDER — LANOLIN ALCOHOL/MO/W.PET/CERES
1000 CREAM (GRAM) TOPICAL DAILY
COMMUNITY
End: 2021-11-17

## 2018-04-04 NOTE — MR AVS SNAPSHOT
[address] [place] [address] 
 
 
 [address] [place] [id number] [phone] Patient: Luciano Hooks MRN:  [id number] Visit Information Date & Time Provider Department Dept. Phone Encounter #  
 4/4/2018 10:15 AM [id number] Brianna Ferris 77 [id number] Follow-up Instructions Return in about 1 week (around 4/11/2018) for Follow Up . Upcoming Health Maintenance Date Due Pneumococcal 19-64 Highest Risk (1 of 3 - PCV13) 4/13/2009 DTaP/Tdap/Td series (1 - Tdap) 4/13/2011 Influenza Age 5 to Adult 8/1/2017 PAP AKA CERVICAL CYTOLOGY 7/3/2018 Allergies as of 4/4/2018  Review Complete On: 9/6/5431 By: Mike Meyers LPN Severity Noted Reaction Type Reactions Clindamycin High 02/22/2011    Shortness of Breath, Itching Hydroxyzine High 02/22/2011    Itching Jolivette [Norethindrone (Contraceptive)] High 12/26/2014    Shortness of Breath, Palpitations Hard to breath Percocet [Oxycodone-acetaminophen] High 02/22/2011    Shortness of Breath, Itching, Nausea and Vomiting Vicodin [Hydrocodone-acetaminophen]  02/21/2011    Unknown (comments) Lorena 28 [Drospirenone-ethinyl Estradiol]  02/21/2011   Intolerance Unknown (comments) Current Immunizations  Never Reviewed No immunizations on file. Not reviewed this visit You Were Diagnosed With   
  
 Codes Comments LUQ abdominal pain    -  Primary ICD-10-CM: R10.12 ICD-9-CM: 789.02 Shortness of breath     ICD-10-CM: R06.02 
ICD-9-CM: 786.05 Vitals BP Pulse Temp Resp Height(growth percentile) Weight(growth percentile) 140/89 (BP 1 Location: Right arm, BP Patient Position: Sitting) 86 99.3 °F (37.4 °C) (Oral) 17 5' 9\" (1.753 m) 267 lb (121.1 kg) SpO2 BMI OB Status Smoking Status 99% 39.43 kg/m2 Having regular periods Never Smoker BMI and BSA Data Body Mass Index Body Surface Area 39.43 kg/m 2 2.43 m 2 Preferred Pharmacy [facility] Phone [facility] Your Updated Medication List  
  
   
This list is accurate as of 4/4/18 10:42 AM.  Always use your most recent med list.  
  
  
  
  
 albuterol 90 mcg/actuation inhaler Commonly known as:  PROVENTIL HFA, VENTOLIN HFA, PROAIR HFA Take 1 Puff by inhalation every six (6) hours as needed for Wheezing or Shortness of Breath. ALPRAZolam 0.5 mg tablet Commonly known as:  [place]amar [place] Take 0.5-1 Tabs by mouth three (3) times daily as needed for Anxiety. Max Daily Amount: 1.5 mg. CINNAMON 500 mg Cap Generic drug:  cinnamon bark Take  by mouth.  
  
 cyanocobalamin 1,000 mcg tablet Take 1,000 mcg by mouth daily. MULTI-VITAMIN PO Take  by mouth daily. omeprazole 40 mg capsule Commonly known as:  PriLOSEC Take 1 Cap by mouth daily. TRINESSA (28) 0.18/0.215/0.25 mg-35 mcg (28) Tab Generic drug:  norgestimate-ethinyl estradiol Take  by mouth. ZyrTEC 10 mg tablet Generic drug:  cetirizine Take 10 mg by mouth daily as needed. Follow-up Instructions Return in about 1 week (around 4/11/2018) for Follow Up . To-Do List   
 04/04/2018 Imaging:  XR ABD (AP AND ERECT OR DECUB) 04/04/2018 Imaging:  XR CHEST PA LAT Patient Instructions Please contact our office if you have any questions about your visit today. 1.) Please get chest and abdominal x-ray as soon as possible. 2.) OK to take Ranitidine (Zantac) twice a day with meals for four days. This can be taken along with omeprazole. 3.) Return in 1 week for follow up. Abdominal Pain: Care Instructions Your Care Instructions Abdominal pain has many possible causes. Some aren't serious and get better on their own in a few days. Others need more testing and treatment. If your pain continues or gets worse, you need to be rechecked and may need more tests to find out what is wrong. You may need surgery to correct the problem. Don't ignore new symptoms, such as fever, nausea and vomiting, urination problems, pain that gets worse, and dizziness. These may be signs of a more serious problem. Your doctor may have recommended a follow-up visit in the next 8 to 12 hours. If you are not getting better, you may need more tests or treatment. The doctor has checked you carefully, but problems can develop later. If you notice any problems or new symptoms, get medical treatment right away. Follow-up care is a key part of your treatment and safety. Be sure to make and go to all appointments, and call your doctor if you are having problems. It's also a good idea to know your test results and keep a list of the medicines you take. How can you care for yourself at home? · Rest until you feel better. · To prevent dehydration, drink plenty of fluids, enough so that your urine is light yellow or clear like water. Choose water and other caffeine-free clear liquids until you feel better. If you have kidney, heart, or liver disease and have to limit fluids, talk with your doctor before you increase the amount of fluids you drink. · If your stomach is upset, eat mild foods, such as rice, dry toast or crackers, bananas, and applesauce. Try eating several small meals instead of two or three large ones. · Wait until 48 hours after all symptoms have gone away before you have spicy foods, alcohol, and drinks that contain caffeine. · Do not eat foods that are high in fat. · Avoid anti-inflammatory medicines such as aspirin, ibuprofen (Advil, Motrin), and naproxen (Aleve). These can cause stomach upset. Talk to your doctor if you take daily aspirin for another health problem. When should you call for help? Call 911 anytime you think you may need emergency care. For example, call if: ? · You passed out (lost consciousness). ? · You pass maroon or very bloody stools. ? · You vomit blood or what looks like coffee grounds. ? · You have new, severe belly pain. ?Call your doctor now or seek immediate medical care if: 
? · Your pain gets worse, especially if it becomes focused in one area of your belly. ? · You have a new or higher fever. ? · Your stools are black and look like tar, or they have streaks of blood. ? · You have unexpected vaginal bleeding. ? · You have symptoms of a urinary tract infection. These may include: 
¨ Pain when you urinate. ¨ Urinating more often than usual. 
¨ Blood in your urine. ? · You are dizzy or lightheaded, or you feel like you may faint. ? Watch closely for changes in your health, and be sure to contact your doctor if: 
? · You are not getting better after 1 day (24 hours). Where can you learn more? Go to http://ford-sweta.info/. Enter H722 in the search box to learn more about \"Abdominal Pain: Care Instructions. \" Current as of: March 20, 2017 Content Version: 11.4 © 3495-4344 [employer]. Care instructions adapted under license by [employer] (which disclaims liability or warranty for this information). If you have questions about a medical condition or this instruction, always ask your healthcare professional. [address] any warranty or liability for your use of this information. Introducing [facility]! Dear Abner Lennox: Thank you for requesting a Ablexis account. Our records indicate that you already have an active Ablexis account. You can access your account anytime at [email or web address] Did you know that you can access your hospital and ER discharge instructions at any time in Ablexis? You can also review all of your test results from your hospital stay or ER visit. Additional Information If you have questions, please visit the Frequently Asked Questions section of the [employer] website at [email or web address] Remember, [employer]em is NOT to be used for urgent needs. For medical emergencies, dial 911. Now available from your iPhone and Android! Please provide this summary of care documentation to your next provider. Your primary care clinician is listed as NELLY LIRIANO. If you have any questions after today's visit, please call [phone].
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
## 2018-04-04 NOTE — PROGRESS NOTES
Chief Complaint   Patient presents with   [facility] Other     states that she has had Left upper quadrant pain and swelling which started Sunday night and she believes that her spleen is swollen     Abdominal Pain     LUQ rates as 6/10, hurts worse with palpation     1. Have you been to the ER, urgent care clinic since your last visit? Hospitalized since your last visit? No    2. Have you seen or consulted any other health care providers outside of the [address] [place] [address] since your last visit? Include any pap smears or colon screening.  No

## 2018-04-04 NOTE — PATIENT INSTRUCTIONS
Please contact our office if you have any questions about your visit today. 1.) Please get chest and abdominal x-ray as soon as possible. 2.) OK to take Ranitidine (Zantac) twice a day with meals for four days. This can be taken along with omeprazole. 3.) Return in 1 week for follow up. Abdominal Pain: Care Instructions  Your Care Instructions    Abdominal pain has many possible causes. Some aren't serious and get better on their own in a few days. Others need more testing and treatment. If your pain continues or gets worse, you need to be rechecked and may need more tests to find out what is wrong. You may need surgery to correct the problem. Don't ignore new symptoms, such as fever, nausea and vomiting, urination problems, pain that gets worse, and dizziness. These may be signs of a more serious problem. Your doctor may have recommended a follow-up visit in the next 8 to 12 hours. If you are not getting better, you may need more tests or treatment. The doctor has checked you carefully, but problems can develop later. If you notice any problems or new symptoms, get medical treatment right away. Follow-up care is a key part of your treatment and safety. Be sure to make and go to all appointments, and call your doctor if you are having problems. It's also a good idea to know your test results and keep a list of the medicines you take. How can you care for yourself at home? · Rest until you feel better. · To prevent dehydration, drink plenty of fluids, enough so that your urine is light yellow or clear like water. Choose water and other caffeine-free clear liquids until you feel better. If you have kidney, heart, or liver disease and have to limit fluids, talk with your doctor before you increase the amount of fluids you drink. · If your stomach is upset, eat mild foods, such as rice, dry toast or crackers, bananas, and applesauce.  Try eating several small meals instead of two or three large ones. · Wait until 48 hours after all symptoms have gone away before you have spicy foods, alcohol, and drinks that contain caffeine. · Do not eat foods that are high in fat. · Avoid anti-inflammatory medicines such as aspirin, ibuprofen (Advil, Motrin), and naproxen (Aleve). These can cause stomach upset. Talk to your doctor if you take daily aspirin for another health problem. When should you call for help? Call 911 anytime you think you may need emergency care. For example, call if:  ? · You passed out (lost consciousness). ? · You pass maroon or very bloody stools. ? · You vomit blood or what looks like coffee grounds. ? · You have new, severe belly pain. ?Call your doctor now or seek immediate medical care if:  ? · Your pain gets worse, especially if it becomes focused in one area of your belly. ? · You have a new or higher fever. ? · Your stools are black and look like tar, or they have streaks of blood. ? · You have unexpected vaginal bleeding. ? · You have symptoms of a urinary tract infection. These may include:  ¨ Pain when you urinate. ¨ Urinating more often than usual.  ¨ Blood in your urine. ? · You are dizzy or lightheaded, or you feel like you may faint. ? Watch closely for changes in your health, and be sure to contact your doctor if:  ? · You are not getting better after 1 day (24 hours). Where can you learn more? Go to http://ford-sweta.info/. Enter K532 in the search box to learn more about \"Abdominal Pain: Care Instructions. \"  Current as of: March 20, 2017  Content Version: 11.4  © 5050-1952 [employer]. Care instructions adapted under license by [employer] (which disclaims liability or warranty for this information).  If you have questions about a medical condition or this instruction, always ask your healthcare professional. [address] any warranty or liability for your use of this information.

## 2018-04-09 ENCOUNTER — TELEPHONE (OUTPATIENT)
Dept: FAMILY MEDICINE CLINIC | Age: 28
End: 2018-04-09

## 2018-04-09 DIAGNOSIS — R10.12 LUQ ABDOMINAL PAIN: Primary | ICD-10-CM

## 2018-04-09 NOTE — TELEPHONE ENCOUNTER
Jersey Jackson,   I sent you a message earlier to let the patient know that her x-ray was normal. Please let her know this. I can order a CMP and CBC and she can go to [facility] to get this done. She also can check with her hematologist as well to see if they can offer any insight. Thanks.   SERVANDO

## 2018-04-09 NOTE — TELEPHONE ENCOUNTER
Pt called and stated that she would like to know her results for X ray and if she needs to have Blood work drawn. Pt stated that she is still in pain and is having swelling. Please advise.

## 2018-04-10 ENCOUNTER — HOSPITAL ENCOUNTER (OUTPATIENT)
Dept: LAB | Age: 28
Discharge: HOME | End: 2018-04-10

## 2018-04-10 PROCEDURE — 99001 SPECIMEN HANDLING PT-LAB: CPT | Performed by: INTERNAL MEDICINE

## 2018-04-11 LAB
ALBUMIN SERPL-MCNC: 4 G/DL (ref 3.5–5.5)
ALBUMIN/GLOB SERPL: 1.4 {RATIO} (ref 1.2–2.2)
ALP SERPL-CCNC: 91 IU/L (ref 39–117)
ALT SERPL-CCNC: 15 IU/L (ref 0–32)
AST SERPL-CCNC: 11 IU/L (ref 0–40)
BASOPHILS # BLD AUTO: 0 X10E3/UL (ref 0–0.2)
BASOPHILS NFR BLD AUTO: 0 %
BILIRUB SERPL-MCNC: <0.2 MG/DL (ref 0–1.2)
BUN SERPL-MCNC: 9 MG/DL (ref 6–20)
BUN/CREAT SERPL: 15 (ref 9–23)
CALCIUM SERPL-MCNC: 9.5 MG/DL (ref 8.7–10.2)
CHLORIDE SERPL-SCNC: 100 MMOL/L (ref 96–106)
CO2 SERPL-SCNC: 24 MMOL/L (ref 18–29)
CREAT SERPL-MCNC: 0.62 MG/DL (ref 0.57–1)
EOSINOPHIL # BLD AUTO: 0.1 X10E3/UL (ref 0–0.4)
EOSINOPHIL NFR BLD AUTO: 1 %
ERYTHROCYTE [DISTWIDTH] IN BLOOD BY AUTOMATED COUNT: 13.6 % (ref 12.3–15.4)
GFR SERPLBLD CREATININE-BSD FMLA CKD-EPI: 124 ML/MIN/1.73
GFR SERPLBLD CREATININE-BSD FMLA CKD-EPI: 143 ML/MIN/1.73
GLOBULIN SER CALC-MCNC: 2.8 G/DL (ref 1.5–4.5)
GLUCOSE SERPL-MCNC: 108 MG/DL (ref 65–99)
HCT VFR BLD AUTO: 38.7 % (ref 34–46.6)
HGB BLD-MCNC: 12.4 G/DL (ref 11.1–15.9)
IMM GRANULOCYTES # BLD: 0 X10E3/UL (ref 0–0.1)
IMM GRANULOCYTES NFR BLD: 0 %
LYMPHOCYTES # BLD AUTO: 3.1 X10E3/UL (ref 0.7–3.1)
LYMPHOCYTES NFR BLD AUTO: 34 %
MCH RBC QN AUTO: 28.8 PG (ref 26.6–33)
MCHC RBC AUTO-ENTMCNC: 32 G/DL (ref 31.5–35.7)
MCV RBC AUTO: 90 FL (ref 79–97)
MONOCYTES # BLD AUTO: 0.6 X10E3/UL (ref 0.1–0.9)
MONOCYTES NFR BLD AUTO: 6 %
NEUTROPHILS # BLD AUTO: 5.4 X10E3/UL (ref 1.4–7)
NEUTROPHILS NFR BLD AUTO: 59 %
PLATELET # BLD AUTO: 370 X10E3/UL (ref 150–379)
POTASSIUM SERPL-SCNC: 4.6 MMOL/L (ref 3.5–5.2)
PROT SERPL-MCNC: 6.8 G/DL (ref 6–8.5)
RBC # BLD AUTO: 4.31 X10E6/UL (ref 3.77–5.28)
SODIUM SERPL-SCNC: 141 MMOL/L (ref 134–144)
WBC # BLD AUTO: 9.2 X10E3/UL (ref 3.4–10.8)

## 2018-04-23 ENCOUNTER — HOSPITAL ENCOUNTER (OUTPATIENT)
Dept: LAB | Age: 28
Discharge: HOME | End: 2018-04-23

## 2018-04-23 PROCEDURE — 99001 SPECIMEN HANDLING PT-LAB: CPT | Performed by: INTERNAL MEDICINE

## 2018-04-27 ENCOUNTER — OFFICE VISIT (OUTPATIENT)
Dept: ONCOLOGY | Age: 28
End: 2018-04-27

## 2018-04-27 ENCOUNTER — HOSPITAL ENCOUNTER (OUTPATIENT)
Dept: ONCOLOGY | Age: 28
Discharge: HOME | End: 2018-04-27

## 2018-04-27 VITALS
BODY MASS INDEX: 39.28 KG/M2 | TEMPERATURE: 98.4 F | DIASTOLIC BLOOD PRESSURE: 90 MMHG | WEIGHT: 266 LBS | SYSTOLIC BLOOD PRESSURE: 149 MMHG | HEART RATE: 99 BPM

## 2018-04-27 DIAGNOSIS — D75.1 POLYCYTHEMIA: Primary | ICD-10-CM

## 2018-04-27 DIAGNOSIS — D75.1 POLYCYTHEMIA: ICD-10-CM

## 2018-04-27 DIAGNOSIS — D75.1 ERYTHROCYTOSIS: ICD-10-CM

## 2018-04-27 PROBLEM — E66.01 SEVERE OBESITY (BMI 35.0-39.9) WITH COMORBIDITY (HCC): Status: ACTIVE | Noted: 2018-04-27

## 2018-04-27 LAB
BASO+EOS+MONOS # BLD AUTO: 0.5 K/UL (ref 0–2.3)
BASO+EOS+MONOS # BLD AUTO: 4 % (ref 0.1–17)
DIFFERENTIAL METHOD BLD: NORMAL
ERYTHROCYTE [DISTWIDTH] IN BLOOD BY AUTOMATED COUNT: 12 % (ref 11.5–14.5)
HCT VFR BLD AUTO: 41.4 % (ref 36–48)
HGB BLD-MCNC: 13.5 G/DL (ref 12–16)
LYMPHOCYTES # BLD: 4.3 K/UL (ref 1.1–5.9)
LYMPHOCYTES NFR BLD: 39 % (ref 14–44)
MCH RBC QN AUTO: 28.5 PG (ref 25–35)
MCHC RBC AUTO-ENTMCNC: 32.6 G/DL (ref 31–37)
MCV RBC AUTO: 87.5 FL (ref 78–102)
NEUTS SEG # BLD: 6.4 K/UL (ref 1.8–9.5)
NEUTS SEG NFR BLD: 57 % (ref 40–70)
PLATELET # BLD AUTO: 406 K/UL (ref 140–440)
RBC # BLD AUTO: 4.73 M/UL (ref 4.1–5.1)
WBC # BLD AUTO: 11.2 K/UL (ref 4.5–13)

## 2018-04-27 NOTE — PROGRESS NOTES
The patient was not seen. She is scheduled to have CT scans done at [facility]. We will reschedule her appointment to be in 2 weeks.
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
## 2018-04-27 NOTE — PATIENT INSTRUCTIONS
Polycythemia: Care Instructions  Your Care Instructions    Polycythemia (say \"paw-nia-sy-THEE-lillie-uh) is an abnormal increase in red blood cells. It happens when the tissue inside your bones (bone marrow) makes too much blood. It also can occur if your blood does not have enough liquid, or plasma. This can make the number of red blood cells seem higher than normal. The extra red blood cells make your blood thicker than normal. This may raise your risk for blood clots that can cause heart attacks or strokes. Clots can form in the deep veins of the body, a condition called deep vein thrombosis. Or, a clot can travel through the blood to a lung (a pulmonary embolism). Your doctor may treat you by taking out some of your blood (phlebotomy). The process is like donating blood. Your doctor may even recommend that you donate blood. You may take pills to stop your body from making red blood cells. You also will get treatment for any other conditions that may cause your body to make too many red blood cells. Follow-up care is a key part of your treatment and safety. Be sure to make and go to all appointments, and call your doctor if you are having problems. It's also a good idea to know your test results and keep a list of the medicines you take. How can you care for yourself at home? · Be safe with medicines. Take your medicines exactly as prescribed. Call your doctor if you think you are having a problem with your medicine. · Drink plenty of fluids, enough so that your urine is light yellow or clear like water, before and after you have blood removed. If you have kidney, heart, or liver disease and have to limit fluids, talk with your doctor before you increase the amount of fluids you drink. · Take it easy after you have had blood removed. Do not do vigorous exercise. · If your doctor recommends aspirin, take it exactly as prescribed.  Call your doctor if you think you are having a problem with your medicine. · Do not smoke. Smoking increases the risk of blood clots and may reduce the amount of oxygen in your blood. If you need help quitting, talk to your doctor about stop-smoking programs and medicines. These can increase your chances of quitting for good. · Take an antihistamine, such as a nondrowsy one like loratadine (Claritin) or one that might make you sleepy like diphenhydramine (Benadryl), if your skin is itchy. Some people who have this condition have itching. · Wear medical alert jewelry that lists your clotting problem. You can buy this at most drugstores. When should you call for help? Call 911 anytime you think you may need emergency care. For example, call if:  ? · You have sudden chest pain and shortness of breath, or you cough up blood. ? · You have symptoms of a stroke. These may include:  ¨ Sudden numbness, tingling, weakness, or loss of movement in your face, arm, or leg, especially on only one side of your body. ¨ Sudden vision changes. ¨ Sudden trouble speaking. ¨ Sudden confusion or trouble understanding simple statements. ¨ Sudden problems with walking or balance. ¨ A sudden, severe headache that is different from past headaches. ? · You have symptoms of a heart attack. These may include:  ¨ Chest pain or pressure, or a strange feeling in the chest.  ¨ Sweating. ¨ Shortness of breath. ¨ Nausea or vomiting. ¨ Pain, pressure, or a strange feeling in the back, neck, jaw, or upper belly or in one or both shoulders or arms. ¨ Lightheadedness or sudden weakness. ¨ A fast or irregular heartbeat. After you call 911, the  may tell you to chew 1 adult-strength or 2 to 4 low-dose aspirin. Wait for an ambulance. Do not try to drive yourself. ?Call your doctor now or seek immediate medical care if:  ? · You have signs of a blood clot, such as:  ¨ Pain in your calf, back of knee, thigh, or groin. ¨ Redness and swelling in your leg or groin. ? Watch closely for changes in your health, and be sure to contact your doctor if you have any problems. Where can you learn more? Go to http://ford-sweta.info/. Enter G140 in the search box to learn more about \"Polycythemia: Care Instructions. \"  Current as of: October 13, 2016  Content Version: 11.4  © 6095-0977 [employer]. Care instructions adapted under license by [employer] (which disclaims liability or warranty for this information). If you have questions about a medical condition or this instruction, always ask your healthcare professional. [address] any warranty or liability for your use of this information.

## 2018-04-30 ENCOUNTER — HOSPITAL ENCOUNTER (OUTPATIENT)
Dept: CT IMAGING | Age: 28
Discharge: HOME | End: 2018-04-30
Attending: INTERNAL MEDICINE
Payer: COMMERCIAL

## 2018-04-30 DIAGNOSIS — R10.9 ABDOMINAL PAIN: ICD-10-CM

## 2018-04-30 LAB
ALBUMIN SERPL-MCNC: 4 G/DL (ref 3.5–5.5)
ALBUMIN/GLOB SERPL: 1.3 {RATIO} (ref 1.2–2.2)
ALP SERPL-CCNC: 86 IU/L (ref 39–117)
ALT SERPL-CCNC: 28 IU/L (ref 0–32)
AST SERPL-CCNC: 21 IU/L (ref 0–40)
BILIRUB SERPL-MCNC: <0.2 MG/DL (ref 0–1.2)
BUN SERPL-MCNC: 14 MG/DL (ref 6–20)
BUN/CREAT SERPL: 23 (ref 9–23)
CALCIUM SERPL-MCNC: 9.5 MG/DL (ref 8.7–10.2)
CHLORIDE SERPL-SCNC: 99 MMOL/L (ref 96–106)
CO2 SERPL-SCNC: 24 MMOL/L (ref 18–29)
CREAT SERPL-MCNC: 0.61 MG/DL (ref 0.57–1)
FERRITIN SERPL-MCNC: 63 NG/ML (ref 15–150)
GFR SERPLBLD CREATININE-BSD FMLA CKD-EPI: 124 ML/MIN/1.73
GFR SERPLBLD CREATININE-BSD FMLA CKD-EPI: 143 ML/MIN/1.73
GLOBULIN SER CALC-MCNC: 3.2 G/DL (ref 1.5–4.5)
GLUCOSE SERPL-MCNC: 80 MG/DL (ref 65–99)
IRON SATN MFR SERPL: 10 % (ref 15–55)
IRON SERPL-MCNC: 41 UG/DL (ref 27–159)
POTASSIUM SERPL-SCNC: 4.5 MMOL/L (ref 3.5–5.2)
PROT SERPL-MCNC: 7.2 G/DL (ref 6–8.5)
SODIUM SERPL-SCNC: 140 MMOL/L (ref 134–144)
TIBC SERPL-MCNC: 402 UG/DL (ref 250–450)
UIBC SERPL-MCNC: 361 UG/DL (ref 131–425)

## 2018-04-30 PROCEDURE — 74170 CT ABD WO CNTRST FLWD CNTRST: CPT

## 2018-04-30 PROCEDURE — 74011636320 HC RX REV CODE- 636/320

## 2018-04-30 RX ADMIN — IOPAMIDOL 100 ML: 612 INJECTION, SOLUTION INTRAVENOUS at 13:00

## 2018-05-11 ENCOUNTER — TELEPHONE (OUTPATIENT)
Dept: ONCOLOGY | Age: 28
End: 2018-05-11

## 2018-05-18 ENCOUNTER — HOSPITAL ENCOUNTER (OUTPATIENT)
Dept: ONCOLOGY | Age: 28
Discharge: HOME | End: 2018-05-18

## 2018-05-18 ENCOUNTER — OFFICE VISIT (OUTPATIENT)
Dept: ONCOLOGY | Age: 28
End: 2018-05-18

## 2018-05-18 VITALS
WEIGHT: 271 LBS | BODY MASS INDEX: 40.02 KG/M2 | DIASTOLIC BLOOD PRESSURE: 85 MMHG | TEMPERATURE: 98.7 F | SYSTOLIC BLOOD PRESSURE: 136 MMHG | HEART RATE: 87 BPM

## 2018-05-18 DIAGNOSIS — D75.1 POLYCYTHEMIA: ICD-10-CM

## 2018-05-18 DIAGNOSIS — D75.1 POLYCYTHEMIA: Primary | ICD-10-CM

## 2018-05-18 LAB
BASO+EOS+MONOS # BLD AUTO: 0.8 K/UL (ref 0–2.3)
BASO+EOS+MONOS # BLD AUTO: 7 % (ref 0.1–17)
DIFFERENTIAL METHOD BLD: NORMAL
ERYTHROCYTE [DISTWIDTH] IN BLOOD BY AUTOMATED COUNT: 12.2 % (ref 11.5–14.5)
HCT VFR BLD AUTO: 37.1 % (ref 36–48)
HGB BLD-MCNC: 12.3 G/DL (ref 12–16)
LYMPHOCYTES # BLD: 3.2 K/UL (ref 1.1–5.9)
LYMPHOCYTES NFR BLD: 28 % (ref 14–44)
MCH RBC QN AUTO: 29.2 PG (ref 25–35)
MCHC RBC AUTO-ENTMCNC: 33.2 G/DL (ref 31–37)
MCV RBC AUTO: 88.1 FL (ref 78–102)
NEUTS SEG # BLD: 7.6 K/UL (ref 1.8–9.5)
NEUTS SEG NFR BLD: 66 % (ref 40–70)
PLATELET # BLD AUTO: 380 K/UL (ref 140–440)
RBC # BLD AUTO: 4.21 M/UL (ref 4.1–5.1)
WBC # BLD AUTO: 11.6 K/UL (ref 4.5–13)

## 2018-05-18 NOTE — MR AVS SNAPSHOT
[address] [place] [address] [address] [place] [address] 
[phone] Patient: Jeaneth Goodwin MRN:  JC Visit Information Date & Time Provider Department Dept. Phone Encounter #  
 2018  4:15 PM [facility] Office [id number] Follow-up Instructions Return in about 3 months (around 2018). Upcoming Health Maintenance Date Due Pneumococcal 19-64 Highest Risk (1 of 3 - PCV13) 2009 DTaP/Tdap/Td series (1 - Tdap) 2011 PAP AKA CERVICAL CYTOLOGY 7/3/2018 Influenza Age 5 to Adult 2018 Allergies as of 2018  Review Complete On: 2018 By: Bartolome Levin, RT Severity Noted Reaction Type Reactions Clindamycin High 2011    Shortness of Breath, Itching Hydroxyzine High 2011    Itching Jolivette [Norethindrone (Contraceptive)] High 2014    Shortness of Breath, Palpitations Hard to breath Percocet [Oxycodone-acetaminophen] High 2011    Shortness of Breath, Itching, Nausea and Vomiting Vicodin [Hydrocodone-acetaminophen]  2011    Unknown (comments) Lorena 28 [Drospirenone-ethinyl Estradiol]  2011   Intolerance Unknown (comments) Current Immunizations  Never Reviewed No immunizations on file. Not reviewed this visit You Were Diagnosed With   
  
 Codes Comments Polycythemia    -  Primary ICD-10-CM: D75.1 ICD-9-CM: 238.4 Vitals BP Pulse Temp Weight(growth percentile) BMI OB Status 136/85 87 98.7 °F (37.1 °C) (Oral) 271 lb (122.9 kg) 40.02 kg/m2 Having regular periods Smoking Status Never Smoker BMI and BSA Data Body Mass Index Body Surface Area 40.02 kg/m 2 2.45 m 2 Preferred Pharmacy Pharmacy Name Phone [facility] [address] [facility] Your Updated Medication List  
  
 This list is accurate as of 5/18/18  4:19 PM.  Always use your most recent med list.  
  
  
  
  
 albuterol 90 mcg/actuation inhaler Commonly known as:  PROVENTIL HFA, VENTOLIN HFA, PROAIR HFA Take 1 Puff by inhalation every six (6) hours as needed for Wheezing or Shortness of Breath. ALPRAZolam 0.5 mg tablet Commonly known as:  Johnice Salts Take 0.5-1 Tabs by mouth three (3) times daily as needed for Anxiety. Max Daily Amount: 1.5 mg. CINNAMON 500 mg Cap Generic drug:  cinnamon bark Take  by mouth.  
  
 cyanocobalamin 1,000 mcg tablet Take 1,000 mcg by mouth daily. MULTI-VITAMIN PO Take  by mouth daily. omeprazole 40 mg capsule Commonly known as:  PriLOSEC Take 1 Cap by mouth daily. TRINESSA (28) 0.18/0.215/0.25 mg-35 mcg (28) Tab Generic drug:  norgestimate-ethinyl estradiol Take  by mouth. ZyrTEC 10 mg tablet Generic drug:  cetirizine Take 10 mg by mouth daily as needed. We Performed the Following COMPLETE CBC & AUTO DIFF WBC [88394 CPT(R)] Follow-up Instructions Return in about 3 months (around 8/18/2018). To-Do List   
 05/18/2018 Lab:  CBC WITH 3 PART DIFF Introducing [facility] SERVICES! Dear Mariza Redd: Thank you for requesting a [facility] account. Our records indicate that you already have an active [facility] account. You can access your account anytime at [email or web address] Did you know that you can access your hospital and ER discharge instructions at any time in [facility]? You can also review all of your test results from your hospital stay or ER visit. Additional Information If you have questions, please visit the Frequently Asked Questions section of the [facility] website at [email or web address] Remember, [facility] is NOT to be used for urgent needs. For medical emergencies, dial 911. Now available from your iPhone and Android! Please provide this summary of care documentation to your next provider. Your primary care clinician is listed as NELLY LIRIANO. If you have any questions after today's visit, please call [phone].

## 2018-05-18 NOTE — PROGRESS NOTES
Hematology/medical oncology progress note    5/18/2018  Several right  YOB: 1990    PCP: Dr. Gregg Molina    Ms. Misha Mitchell is a 69-year-old woman who had an elevated hemoglobin hematocrit which was concerning for possible erythrocytosis. She also complained of some abdominal pain and flank pain. I informed the patient that his CBC today shows all normal values with a WBC count of 11.6, hemoglobin is 12.3 g/dL, hematocrit is 37.7%, and the platelet count was 20 80,000. The CT scan through the abdomen shows no abnormality. There was no evidence of pleural effusion, hiatal hernia, adrenal mass, or intra-abdominal lymphadenopathy. The bone structures are intact with no evidence of deformity or abnormality. In summary I have explained to the patient that all of her lab tests and imaging studies have been normal.  No additional tests or procedures are recommended are warranted. I will see her back in the hematology clinic on a as needed basis in the future. Patient had her questions answered to her satisfaction. Total time 30 minutes, greater than 50% of the time was in counseling and coordination of care. Serg Paniagua MD, [place]

## 2018-05-21 LAB
ALBUMIN SERPL-MCNC: 4 G/DL (ref 3.5–5.5)
ALBUMIN/GLOB SERPL: 1.4 {RATIO} (ref 1.2–2.2)
ALP SERPL-CCNC: 85 IU/L (ref 39–117)
ALT SERPL-CCNC: 13 IU/L (ref 0–32)
AST SERPL-CCNC: 14 IU/L (ref 0–40)
BILIRUB SERPL-MCNC: <0.2 MG/DL (ref 0–1.2)
BUN SERPL-MCNC: 12 MG/DL (ref 6–20)
BUN/CREAT SERPL: 20 (ref 9–23)
CALCIUM SERPL-MCNC: 9.5 MG/DL (ref 8.7–10.2)
CHLORIDE SERPL-SCNC: 100 MMOL/L (ref 96–106)
CO2 SERPL-SCNC: 23 MMOL/L (ref 18–29)
CREAT SERPL-MCNC: 0.59 MG/DL (ref 0.57–1)
GFR SERPLBLD CREATININE-BSD FMLA CKD-EPI: 125 ML/MIN/1.73
GFR SERPLBLD CREATININE-BSD FMLA CKD-EPI: 144 ML/MIN/1.73
GLOBULIN SER CALC-MCNC: 2.8 G/DL (ref 1.5–4.5)
GLUCOSE SERPL-MCNC: 83 MG/DL (ref 65–99)
POTASSIUM SERPL-SCNC: 4.5 MMOL/L (ref 3.5–5.2)
PROT SERPL-MCNC: 6.8 G/DL (ref 6–8.5)
SODIUM SERPL-SCNC: 137 MMOL/L (ref 134–144)

## 2018-07-08 DIAGNOSIS — R10.12 LUQ ABDOMINAL PAIN: ICD-10-CM

## 2021-01-20 ENCOUNTER — TRANSCRIBE ORDER (OUTPATIENT)
Dept: SCHEDULING | Age: 31
End: 2021-01-20

## 2021-01-20 DIAGNOSIS — Z00.6 EXAMINATION OF PARTICIPANT IN CLINICAL TRIAL: Primary | ICD-10-CM

## 2021-02-09 ENCOUNTER — HOSPITAL ENCOUNTER (OUTPATIENT)
Dept: MAMMOGRAPHY | Age: 31
Discharge: HOME | End: 2021-02-09
Attending: SPECIALIST

## 2021-02-09 ENCOUNTER — HOSPITAL ENCOUNTER (OUTPATIENT)
Dept: ULTRASOUND IMAGING | Age: 31
Discharge: HOME | End: 2021-02-09
Attending: SPECIALIST

## 2021-02-09 DIAGNOSIS — Z00.6 EXAMINATION OF PARTICIPANT IN CLINICAL TRIAL: ICD-10-CM

## 2021-02-09 PROCEDURE — 76830 TRANSVAGINAL US NON-OB: CPT

## 2021-02-09 PROCEDURE — 77080 DXA BONE DENSITY AXIAL: CPT

## 2021-04-28 ENCOUNTER — VIRTUAL VISIT (OUTPATIENT)
Dept: FAMILY MEDICINE CLINIC | Age: 31
End: 2021-04-28
Payer: COMMERCIAL

## 2021-04-28 DIAGNOSIS — R73.03 PREDIABETES: ICD-10-CM

## 2021-04-28 DIAGNOSIS — K58.9 IRRITABLE BOWEL SYNDROME, UNSPECIFIED TYPE: ICD-10-CM

## 2021-04-28 DIAGNOSIS — Z87.42 HISTORY OF ENDOMETRIOSIS: ICD-10-CM

## 2021-04-28 DIAGNOSIS — E78.2 MIXED HYPERLIPIDEMIA: Primary | ICD-10-CM

## 2021-04-28 DIAGNOSIS — E55.9 VITAMIN D DEFICIENCY: ICD-10-CM

## 2021-04-28 PROCEDURE — 99204 OFFICE O/P NEW MOD 45 MIN: CPT | Performed by: NURSE PRACTITIONER

## 2021-04-28 NOTE — PROGRESS NOTES
Kumar Arrieta presents today for   Chief Complaint   Patient presents with   [address] preferred language for health care discussion is english/other. Is someone accompanying this pt? no    Is the patient using any DME equipment during [address]? no    Depression Screening:  3 most recent PHQ Screens 4/28/2021   Little interest or pleasure in doing things Not at all   Feeling down, depressed, irritable, or hopeless Not at all   Total Score PHQ 2 0       Learning Assessment:  Learning Assessment 4/28/2021   PRIMARY LEARNER Patient   HIGHEST LEVEL OF EDUCATION - PRIMARY LEARNER  SOME COLLEGE   BARRIERS PRIMARY LEARNER NONE   CO-LEARNER CAREGIVER No   PRIMARY LANGUAGE ENGLISH    NEED No   LEARNER PREFERENCE PRIMARY DEMONSTRATION   ANSWERED BY patient   RELATIONSHIP SELF       Abuse Screening:  Abuse Screening Questionnaire 4/28/2021   Do you ever feel afraid of your partner? N   Are you in a relationship with someone who physically or mentally threatens you? N   Is it safe for you to go home? Y       Generalized Anxiety  No flowsheet data found. Health Maintenance Due   Topic Date Due    COVID-19 Vaccine (1) Never done    DTaP/Tdap/Td series (1 - Tdap) Never done    A1C test (Diabetic or Prediabetic)  05/23/2018    PAP AKA CERVICAL CYTOLOGY  07/03/2018   . Health Maintenance reviewed and discussed and ordered per Provider. Advance Directive:  1. Do you have an advance directive in place?  Patient Reply:no
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
## 2021-05-19 ENCOUNTER — VIRTUAL VISIT (OUTPATIENT)
Dept: FAMILY MEDICINE CLINIC | Age: 31
End: 2021-05-19
Payer: COMMERCIAL

## 2021-05-19 DIAGNOSIS — E55.9 VITAMIN D DEFICIENCY: ICD-10-CM

## 2021-05-19 DIAGNOSIS — Z13.29 SCREENING FOR ENDOCRINE DISORDER: ICD-10-CM

## 2021-05-19 DIAGNOSIS — E78.2 MIXED HYPERLIPIDEMIA: Primary | ICD-10-CM

## 2021-05-19 DIAGNOSIS — R73.03 PREDIABETES: ICD-10-CM

## 2021-05-19 DIAGNOSIS — L55.1 SUNBURN, BLISTERING: ICD-10-CM

## 2021-05-19 PROCEDURE — 99214 OFFICE O/P EST MOD 30 MIN: CPT | Performed by: NURSE PRACTITIONER

## 2021-05-19 RX ORDER — IBUPROFEN 800 MG/1
800 TABLET ORAL
Qty: 30 TABLET | Refills: 0 | Status: SHIPPED | OUTPATIENT
Start: 2021-05-19 | End: 2022-10-14

## 2021-05-19 RX ORDER — SILVER SULFADIAZINE 10 G/1000G
CREAM TOPICAL 2 TIMES DAILY
Qty: 50 G | Refills: 0 | Status: SHIPPED | OUTPATIENT
Start: 2021-05-19 | End: 2021-08-05

## 2021-05-19 NOTE — PROGRESS NOTES
Irene Brown presents today for   Chief Complaint   Patient presents with    [address] preferred language for health care discussion is english/other. Is someone accompanying this pt? no    Is the patient using any DME equipment during [address]? no    Depression Screening:  3 most recent PHQ Screens 5/19/2021   Little interest or pleasure in doing things Not at all   Feeling down, depressed, irritable, or hopeless Not at all   Total Score PHQ 2 0       Learning Assessment:  Learning Assessment 4/28/2021   PRIMARY LEARNER Patient   HIGHEST LEVEL OF EDUCATION - PRIMARY LEARNER  SOME COLLEGE   BARRIERS PRIMARY LEARNER NONE   CO-LEARNER CAREGIVER No   PRIMARY LANGUAGE ENGLISH    NEED No   LEARNER PREFERENCE PRIMARY DEMONSTRATION   ANSWERED BY patient   RELATIONSHIP SELF       Abuse Screening:  Abuse Screening Questionnaire 4/28/2021   Do you ever feel afraid of your partner? N   Are you in a relationship with someone who physically or mentally threatens you? N   Is it safe for you to go home? Y       Generalized Anxiety  No flowsheet data found. Health Maintenance Due   Topic Date Due    COVID-19 Vaccine (1) Never done    DTaP/Tdap/Td series (1 - Tdap) Never done    A1C test (Diabetic or Prediabetic)  05/23/2018    PAP AKA CERVICAL CYTOLOGY  07/03/2018   . Health Maintenance reviewed and discussed and ordered per Provider. Coordination of Care:  1. Have you been to the ER, urgent care clinic since your last visit? Hospitalized since your last visit? no    2. Have you seen or consulted any other health care providers outside of the [address] [place] [address] since your last visit? Include any pap smears or colon screening.  no      Advance Directive:  Discussed 4/28/21
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
## 2021-05-22 ENCOUNTER — HOSPITAL ENCOUNTER (OUTPATIENT)
Dept: LAB | Age: 31
Discharge: HOME | End: 2021-05-22

## 2021-05-22 LAB
25(OH)D3 SERPL-MCNC: 22 NG/ML (ref 32–100)
A-G RATIO,AGRAT: 1.6 RATIO (ref 1.1–2.6)
ABSOLUTE LYMPHOCYTE COUNT, 10803: 3.5 K/UL (ref 1–4.8)
ALBUMIN SERPL-MCNC: 4.1 G/DL (ref 3.5–5)
ALP SERPL-CCNC: 98 U/L (ref 25–115)
ALT SERPL-CCNC: 45 U/L (ref 5–40)
ANION GAP SERPL CALC-SCNC: 11.4 MMOL/L (ref 3–15)
AST SERPL W P-5'-P-CCNC: 28 U/L (ref 10–37)
AVG GLU, 10930: 123 MG/DL (ref 91–123)
BASOPHILS # BLD: 0.1 K/UL (ref 0–0.2)
BASOPHILS NFR BLD: 1 % (ref 0–2)
BILIRUB SERPL-MCNC: 0.2 MG/DL (ref 0.2–1.2)
BUN SERPL-MCNC: 9 MG/DL (ref 6–22)
CALCIUM SERPL-MCNC: 9.5 MG/DL (ref 8.4–10.5)
CHLORIDE SERPL-SCNC: 99 MMOL/L (ref 98–110)
CHOLEST SERPL-MCNC: 201 MG/DL (ref 110–200)
CO2 SERPL-SCNC: 29 MMOL/L (ref 20–32)
CREAT SERPL-MCNC: 0.6 MG/DL (ref 0.5–1.2)
EOSINOPHIL # BLD: 0.1 K/UL (ref 0–0.5)
EOSINOPHIL NFR BLD: 1 % (ref 0–6)
ERYTHROCYTE [DISTWIDTH] IN BLOOD BY AUTOMATED COUNT: 13.6 % (ref 10–15.5)
GFRAA, 66117: >60
GFRNA, 66118: >60
GLOBULIN,GLOB: 2.5 G/DL (ref 2–4)
GLUCOSE SERPL-MCNC: 113 MG/DL (ref 70–99)
GRANULOCYTES,GRANS: 56 % (ref 40–75)
HBA1C MFR BLD HPLC: 5.9 % (ref 4.8–5.6)
HCT VFR BLD AUTO: 40.9 % (ref 35.1–46.5)
HDLC SERPL-MCNC: 37 MG/DL
HDLC SERPL-MCNC: 5.4 MG/DL (ref 0–5)
HGB BLD-MCNC: 12.4 G/DL (ref 11.7–15.5)
LDL/HDL RATIO,LDHD: 3.6
LDLC SERPL CALC-MCNC: 131 MG/DL (ref 50–99)
LYMPHOCYTES, LYMLT: 35 % (ref 20–45)
MCH RBC QN AUTO: 28 PG (ref 26–34)
MCHC RBC AUTO-ENTMCNC: 30 G/DL (ref 31–36)
MCV RBC AUTO: 94 FL (ref 81–99)
MONOCYTES # BLD: 0.6 K/UL (ref 0.1–1)
MONOCYTES NFR BLD: 6 % (ref 3–12)
NEUTROPHILS # BLD AUTO: 5.5 K/UL (ref 1.8–7.7)
NON-HDL CHOLESTEROL, 011976: 164 MG/DL
PLATELET # BLD AUTO: 405 K/UL (ref 140–440)
PMV BLD AUTO: 10.3 FL (ref 9–13)
POTASSIUM SERPL-SCNC: 4.3 MMOL/L (ref 3.5–5.5)
PROT SERPL-MCNC: 6.6 G/DL (ref 6.4–8.3)
RBC # BLD AUTO: 4.37 M/UL (ref 3.8–5.2)
SENTARA SPECIMEN COL,SENBCF: NORMAL
SODIUM SERPL-SCNC: 139 MMOL/L (ref 133–145)
TRIGL SERPL-MCNC: 168 MG/DL (ref 40–149)
TSH SERPL DL<=0.005 MIU/L-ACNC: 1.46 MCU/ML (ref 0.27–4.2)
VLDLC SERPL CALC-MCNC: 34 MG/DL (ref 8–30)
WBC # BLD AUTO: 9.8 K/UL (ref 4–11)

## 2021-05-22 PROCEDURE — 99001 SPECIMEN HANDLING PT-LAB: CPT

## 2021-06-09 ENCOUNTER — VIRTUAL VISIT (OUTPATIENT)
Dept: FAMILY MEDICINE CLINIC | Age: 31
End: 2021-06-09
Payer: COMMERCIAL

## 2021-06-09 DIAGNOSIS — E78.2 MIXED HYPERLIPIDEMIA: ICD-10-CM

## 2021-06-09 DIAGNOSIS — E55.9 VITAMIN D INSUFFICIENCY: ICD-10-CM

## 2021-06-09 DIAGNOSIS — Z71.2 ENCOUNTER TO DISCUSS TEST RESULTS: ICD-10-CM

## 2021-06-09 DIAGNOSIS — L55.1 SUNBURN, BLISTERING: Primary | ICD-10-CM

## 2021-06-09 PROCEDURE — 99214 OFFICE O/P EST MOD 30 MIN: CPT | Performed by: NURSE PRACTITIONER

## 2021-06-09 NOTE — PROGRESS NOTES
James Petersen presents today for   Chief Complaint   Patient presents with    Follow-up     8 day    Sunburn     healing well    Results     discuss lab results       James Petersen preferred language for health care discussion is english/other. Is someone accompanying this pt? no    Is the patient using any DME equipment during [address]? no    Depression Screening:  3 most recent PHQ Screens 6/9/2021   Little interest or pleasure in doing things Not at all   Feeling down, depressed, irritable, or hopeless Not at all   Total Score PHQ 2 0       Learning Assessment:  Learning Assessment 4/28/2021   PRIMARY LEARNER Patient   HIGHEST LEVEL OF EDUCATION - PRIMARY LEARNER  SOME COLLEGE   BARRIERS PRIMARY LEARNER NONE   CO-LEARNER CAREGIVER No   PRIMARY LANGUAGE ENGLISH    NEED No   LEARNER PREFERENCE PRIMARY DEMONSTRATION   ANSWERED BY patient   RELATIONSHIP SELF       Abuse Screening:  Abuse Screening Questionnaire 4/28/2021   Do you ever feel afraid of your partner? N   Are you in a relationship with someone who physically or mentally threatens you? N   Is it safe for you to go home? Y       Generalized Anxiety  No flowsheet data found. Health Maintenance Due   Topic Date Due    COVID-19 Vaccine (1) Never done    DTaP/Tdap/Td series (1 - Tdap) Never done    PAP AKA CERVICAL CYTOLOGY  07/03/2018   . Health Maintenance reviewed and discussed and ordered per Provider. Coordination of Care:  1. Have you been to the ER, urgent care clinic since your last visit? Hospitalized since your last visit? no    2. Have you seen or consulted any other health care providers outside of the [address] [place] [address] since your last visit? Include any pap smears or colon screening.  no      Advance Directive:  Discussed 4/28/21
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
## 2021-07-02 ENCOUNTER — OFFICE VISIT (OUTPATIENT)
Dept: FAMILY MEDICINE CLINIC | Age: 31
End: 2021-07-02
Payer: COMMERCIAL

## 2021-07-02 VITALS
HEART RATE: 84 BPM | SYSTOLIC BLOOD PRESSURE: 132 MMHG | BODY MASS INDEX: 38.21 KG/M2 | RESPIRATION RATE: 18 BRPM | HEIGHT: 69 IN | OXYGEN SATURATION: 99 % | DIASTOLIC BLOOD PRESSURE: 83 MMHG | WEIGHT: 258 LBS | TEMPERATURE: 99.1 F

## 2021-07-02 DIAGNOSIS — T63.441A BEE STING, ACCIDENTAL OR UNINTENTIONAL, INITIAL ENCOUNTER: Primary | ICD-10-CM

## 2021-07-02 DIAGNOSIS — Z91.09 ALLERGY TO ENVIRONMENTAL FACTORS: ICD-10-CM

## 2021-07-02 PROCEDURE — 96372 THER/PROPH/DIAG INJ SC/IM: CPT | Performed by: NURSE PRACTITIONER

## 2021-07-02 PROCEDURE — 99214 OFFICE O/P EST MOD 30 MIN: CPT | Performed by: NURSE PRACTITIONER

## 2021-07-02 RX ORDER — TRIAMCINOLONE ACETONIDE 40 MG/ML
40 INJECTION, SUSPENSION INTRA-ARTICULAR; INTRAMUSCULAR ONCE
Status: DISCONTINUED | OUTPATIENT
Start: 2021-07-02 | End: 2021-07-02 | Stop reason: CLARIF

## 2021-07-02 RX ORDER — ALBUTEROL SULFATE 90 UG/1
1 AEROSOL, METERED RESPIRATORY (INHALATION)
Qty: 1 INHALER | Refills: 3 | Status: SHIPPED | OUTPATIENT
Start: 2021-07-02

## 2021-07-02 RX ORDER — PREDNISONE 20 MG/1
TABLET ORAL
Qty: 24 TABLET | Refills: 0 | Status: SHIPPED | OUTPATIENT
Start: 2021-07-02 | End: 2021-07-19 | Stop reason: ALTCHOICE

## 2021-07-02 RX ORDER — TRIAMCINOLONE ACETONIDE 40 MG/ML
40 INJECTION, SUSPENSION INTRA-ARTICULAR; INTRAMUSCULAR ONCE
Status: CANCELLED | OUTPATIENT
Start: 2021-07-02 | End: 2021-07-02

## 2021-07-02 RX ORDER — TRIAMCINOLONE ACETONIDE 40 MG/ML
40 INJECTION, SUSPENSION INTRA-ARTICULAR; INTRAMUSCULAR ONCE
Qty: 1 ML | Refills: 0
Start: 2021-07-02 | End: 2021-07-02

## 2021-07-02 NOTE — PROGRESS NOTES
Payton Murdock presents today for   Chief Complaint   Patient presents with    Bee sting     burning, itching, painful onset 7/1/2021       Is someone accompanying this pt? No     Is the patient using any DME equipment during OV? No     Depression Screening:  3 most recent PHQ Screens 7/2/2021   Little interest or pleasure in doing things Not at all   Feeling down, depressed, irritable, or hopeless Not at all   Total Score PHQ 2 0       Learning Assessment:  Learning Assessment 4/28/2021   PRIMARY LEARNER Patient   HIGHEST LEVEL OF EDUCATION - PRIMARY LEARNER  SOME COLLEGE   BARRIERS PRIMARY LEARNER NONE   CO-LEARNER CAREGIVER No   PRIMARY LANGUAGE ENGLISH    NEED No   LEARNER PREFERENCE PRIMARY DEMONSTRATION   ANSWERED BY patient   RELATIONSHIP SELF       Fall Risk  No flowsheet data found. ADL  No flowsheet data found. Travel Screening:    Travel Screening     Question   Response    In the last month, have you been in contact with someone who was confirmed or suspected to have Coronavirus / COVID-19? No / Unsure    Have you had a COVID-19 viral test in the last 14 days? No    Do you have any of the following new or worsening symptoms? Have you traveled internationally or domestically in the last month? No      Travel History   Travel since 06/02/21     No documented travel since 06/02/21          Health Maintenance reviewed and discussed and ordered per Provider. Health Maintenance Due   Topic Date Due    COVID-19 Vaccine (1) Never done    DTaP/Tdap/Td series (1 - Tdap) Never done    PAP AKA CERVICAL CYTOLOGY  07/03/2018   . Coordination of Care:  1. Have you been to the ER, urgent care clinic since your last visit? Hospitalized since your last visit? No     2. Have you seen or consulted any other health care providers outside of the [address] [place] [address] since your last visit? Include any pap smears or colon screening.  No

## 2021-07-02 NOTE — PATIENT INSTRUCTIONS

## 2021-07-02 NOTE — PROGRESS NOTES
Chief Complaint   Patient presents with    Bee sting     burning, itching, painful onset 7/1/2021     Assessment & Plan:     1. Bee sting, accidental or unintentional, initial encounter  Comments:  Kenalog injection today, start tapered Prednisone tomorrow  Orders:  -     predniSONE (DELTASONE) 20 mg tablet; Take 3 tablets by mouth once a day for 4 days, then 2 tablets by mouth once a day for 4 days, then 1 tablet by mouth once a day for 4 days then stop., Normal, Disp-24 Tablet, R-0  -     TRIAMCINOLONE ACETONIDE INJ  2. Allergy to environmental factors  Assessment & Plan:  Well-controlled on PRN regimen, continue  Orders:  -     albuterol (PROVENTIL HFA, VENTOLIN HFA, PROAIR HFA) 90 mcg/actuation inhaler; Take 1 Puff by inhalation every six (6) hours as needed for Wheezing or Shortness of Breath., Normal, Disp-1 Inhaler, R-3  -     TRIAMCINOLONE ACETONIDE INJ    Follow-up and Dispositions    · Return if symptoms worsen or fail to improve, for bee sting. Subjective:     HPI    Insect Bite-  Occurred last night while cutting the grass  Location:  Left foot, left upper arm, two on right upper arm  Symptoms:  Burning, itching, pain  Denies wheezing or SOB    Allergy-  Triggers:  Paint, lawn chemicals  Treatment:  Albuterol as needed  Specialist:  None    Home Medications    Medication Sig Start Date End Date Taking? Authorizing Provider   albuterol (PROVENTIL HFA, VENTOLIN HFA, PROAIR HFA) 90 mcg/actuation inhaler Take 1 Puff by inhalation every six (6) hours as needed for Wheezing or Shortness of Breath. 7/2/21  Yes Robert Pratt NP   predniSONE (DELTASONE) 20 mg tablet Take 3 tablets by mouth once a day for 4 days, then 2 tablets by mouth once a day for 4 days, then 1 tablet by mouth once a day for 4 days then stop. 7/2/21  Yes Nicolasa Painter NP   triamcinolone acetonide (Kenalog) 40 mg/mL injection 1 mL by IntraMUSCular route once for 1 dose.  7/2/21 7/2/21 Yes Robert Pratt NP   ibuprofen (MOTRIN) 800 mg tablet Take 1 Tablet by mouth every six (6) hours as needed for Pain. 5/19/21  Yes Cristina HUTCHINSON, NP   levonorgest-eth.estradioL-iron 0.1 mg-0.02 mg (21)/36.5 mg(7) tab Take  by mouth daily. Yes Provider, Historical   elagolix 150 mg tab Take  by mouth daily. Yes Provider, Historical   cyanocobalamin 1,000 mcg tablet Take 1,000 mcg by mouth daily. Yes Provider, Historical   norgestimate-ethinyl estradiol (TRINESSA, 28,) 0.18/0.215/0.25 mg-35 mcg (28) tab Take  by mouth. Yes Provider, Historical   omeprazole (PRILOSEC) 40 mg capsule Take 1 Cap by mouth daily. 4/27/16  Yes Jasiel Mooney MD   cetirizine (ZYRTEC) 10 mg tablet Take 10 mg by mouth daily as needed. Yes Provider, Historical   MULTI-VITAMIN PO Take  by mouth daily. 2/21/11  Yes Provider, Historical   silver sulfADIAZINE (SILVADENE) 1 % topical cream Apply  to affected area two (2) times a day. Patient not taking: Reported on 7/2/2021 5/19/21   Cristina Antunez B, NP   cinnamon bark (CINNAMON) 500 mg cap Take  by mouth. Patient not taking: Reported on 7/2/2021    Provider, Historical   albuterol (PROVENTIL HFA, VENTOLIN HFA, PROAIR HFA) 90 mcg/actuation inhaler Take 1 Puff by inhalation every six (6) hours as needed for Wheezing or Shortness of Breath. 9/20/17 7/2/21  Jasiel Mooney MD   ALPRAZolam Sumanth Huddle) 0.5 mg tablet Take 0.5-1 Tabs by mouth three (3) times daily as needed for Anxiety. Max Daily Amount: 1.5 mg. Patient not taking: Reported on 7/2/2021 5/23/17   Jasiel Mooney MD       Review of Systems   Constitutional: Negative for chills, fever and malaise/fatigue. HENT: Negative for sore throat. Respiratory: Negative for cough, shortness of breath and wheezing. Cardiovascular: Negative for chest pain.    Skin:        Reports bee stings     Objective:   /83 (BP 1 Location: Left lower arm, BP Patient Position: Sitting, BP Cuff Size: Adult)   Pulse 84   Temp 99.1 °F (37.3 °C) (Oral)   Resp 18   Ht 5' 9\" (1.753 m) Wt 258 lb (117 kg)   SpO2 99%   BMI 38.10 kg/m²      Physical Exam  Vitals and nursing note reviewed. Constitutional:       General: She is not in acute distress. Appearance: She is not ill-appearing. HENT:      Head: Normocephalic and atraumatic. Cardiovascular:      Rate and Rhythm: Normal rate and regular rhythm. Heart sounds: No murmur heard. No friction rub. No gallop. Pulmonary:      Effort: Pulmonary effort is normal. No respiratory distress. Breath sounds: No wheezing, rhonchi or rales. Musculoskeletal:      Right upper arm: Swelling and edema present. Left upper arm: Swelling and edema present. Arms:         Legs:    Skin:     General: Skin is warm and dry. Neurological:      General: No focal deficit present. Mental Status: She is alert and oriented to person, place, and time. Psychiatric:         Mood and Affect: Mood normal.         Thought Content:  Thought content normal.         Judgment: Judgment normal.        DION Whitfield Arm-LUZ MARINA

## 2021-07-08 ENCOUNTER — OFFICE VISIT (OUTPATIENT)
Dept: FAMILY MEDICINE CLINIC | Age: 31
End: 2021-07-08
Payer: COMMERCIAL

## 2021-07-08 VITALS
HEIGHT: 69 IN | HEART RATE: 66 BPM | TEMPERATURE: 97.6 F | DIASTOLIC BLOOD PRESSURE: 80 MMHG | SYSTOLIC BLOOD PRESSURE: 127 MMHG | RESPIRATION RATE: 16 BRPM | BODY MASS INDEX: 37.92 KG/M2 | OXYGEN SATURATION: 100 % | WEIGHT: 256 LBS

## 2021-07-08 DIAGNOSIS — I51.7 ATRIAL ENLARGEMENT, LEFT: ICD-10-CM

## 2021-07-08 DIAGNOSIS — I25.2 OLD ANTEROSEPTAL MYOCARDIAL INFARCTION: ICD-10-CM

## 2021-07-08 DIAGNOSIS — R94.31 ABNORMAL EKG: ICD-10-CM

## 2021-07-08 DIAGNOSIS — T63.441A BEE STING, ACCIDENTAL OR UNINTENTIONAL, INITIAL ENCOUNTER: Primary | ICD-10-CM

## 2021-07-08 DIAGNOSIS — R07.89 CHEST TIGHTNESS: ICD-10-CM

## 2021-07-08 DIAGNOSIS — R00.2 PALPITATIONS: ICD-10-CM

## 2021-07-08 DIAGNOSIS — Z91.09 ALLERGY TO ENVIRONMENTAL FACTORS: ICD-10-CM

## 2021-07-08 PROCEDURE — 99214 OFFICE O/P EST MOD 30 MIN: CPT | Performed by: NURSE PRACTITIONER

## 2021-07-08 RX ORDER — EPINEPHRINE 0.3 MG/.3ML
0.3 INJECTION SUBCUTANEOUS
Qty: 1 SYRINGE | Refills: 0 | Status: CANCELLED | OUTPATIENT
Start: 2021-07-08 | End: 2021-07-08

## 2021-07-08 NOTE — PROGRESS NOTES
Kaity Pendleton presents today for   Chief Complaint   Patient presents with   [facility] Follow Up     patient was seen here in the office on 7/2/21 for bee sting, she was given rx for prednisone and triamcinolone injection. She went to an urgent care later that night due to chest tightness and difficulty breathing, she was given epenephrine injection and advised to go to the ER. She went to [facility] ER for evaluation. Kaity Pendleton preferred language for health care discussion is english/other. Is someone accompanying this pt? no    Is the patient using any DME equipment during [address]? no    Depression Screening:  3 most recent PHQ Screens 7/8/2021   Little interest or pleasure in doing things Not at all   Feeling down, depressed, irritable, or hopeless Not at all   Total Score PHQ 2 0       Learning Assessment:  Learning Assessment 4/28/2021   PRIMARY LEARNER Patient   HIGHEST LEVEL OF EDUCATION - PRIMARY LEARNER  SOME COLLEGE   BARRIERS PRIMARY LEARNER NONE   CO-LEARNER CAREGIVER No   PRIMARY LANGUAGE ENGLISH    NEED No   LEARNER PREFERENCE PRIMARY DEMONSTRATION   ANSWERED BY patient   RELATIONSHIP SELF       Abuse Screening:  Abuse Screening Questionnaire 4/28/2021   Do you ever feel afraid of your partner? N   Are you in a relationship with someone who physically or mentally threatens you? N   Is it safe for you to go home? Y       Generalized Anxiety  No flowsheet data found. Health Maintenance Due   Topic Date Due    COVID-19 Vaccine (1) Never done    DTaP/Tdap/Td series (1 - Tdap) Never done    PAP AKA CERVICAL CYTOLOGY  07/03/2018   . Health Maintenance reviewed and discussed and ordered per Provider. Coordination of Care:  1. Have you been to the ER, urgent care clinic since your last visit? Hospitalized since your last visit? Yes, urgent care and [facility]    2.  Have you seen or consulted any other health care providers outside of the [address] [place] [address] since your last visit? Include any pap smears or colon screening.  no
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
## 2021-07-19 ENCOUNTER — VIRTUAL VISIT (OUTPATIENT)
Dept: FAMILY MEDICINE CLINIC | Age: 31
End: 2021-07-19
Payer: COMMERCIAL

## 2021-07-19 DIAGNOSIS — Z91.09 ALLERGY TO ENVIRONMENTAL FACTORS: ICD-10-CM

## 2021-07-19 DIAGNOSIS — F41.0 PANIC ATTACKS: Primary | ICD-10-CM

## 2021-07-19 DIAGNOSIS — F41.9 ANXIETY: ICD-10-CM

## 2021-07-19 PROCEDURE — 99213 OFFICE O/P EST LOW 20 MIN: CPT | Performed by: NURSE PRACTITIONER

## 2021-07-19 RX ORDER — LORAZEPAM 0.5 MG/1
0.5 TABLET ORAL
Qty: 6 TABLET | Refills: 0 | Status: SHIPPED | OUTPATIENT
Start: 2021-07-19 | End: 2021-11-17

## 2021-07-19 NOTE — PROGRESS NOTES
Brii Gregory presents today for   Chief Complaint   Patient presents with    Anxiety       Is someone accompanying this pt? no    Is the patient using any DME equipment during OV? No     Depression Screening:  3 most recent PHQ Screens 7/19/2021   Little interest or pleasure in doing things Not at all   Feeling down, depressed, irritable, or hopeless Not at all   Total Score PHQ 2 0       Learning Assessment:  Learning Assessment 4/28/2021   PRIMARY LEARNER Patient   HIGHEST LEVEL OF EDUCATION - PRIMARY LEARNER  SOME COLLEGE   BARRIERS PRIMARY LEARNER NONE   CO-LEARNER CAREGIVER No   PRIMARY LANGUAGE ENGLISH    NEED No   LEARNER PREFERENCE PRIMARY DEMONSTRATION   ANSWERED BY patient   RELATIONSHIP SELF       Fall Risk  No flowsheet data found. ADL  No flowsheet data found. Travel Screening:    Travel Screening     Question   Response    In the last month, have you been in contact with someone who was confirmed or suspected to have Coronavirus / COVID-19? No / Unsure    Have you had a COVID-19 viral test in the last 14 days? No    Do you have any of the following new or worsening symptoms? Have you traveled internationally or domestically in the last month? No      Travel History   Travel since 06/19/21     No documented travel since 06/19/21          Health Maintenance reviewed and discussed and ordered per Provider. Health Maintenance Due   Topic Date Due    COVID-19 Vaccine (1) Never done    DTaP/Tdap/Td series (1 - Tdap) Never done    PAP AKA CERVICAL CYTOLOGY  07/03/2018   . Coordination of Care:  1. Have you been to the ER, urgent care clinic since your last visit? Hospitalized since your last visit? Yes, chest pain [facility] last Monday. 2. Have you seen or consulted any other health care providers outside of the [address] [place] [address] since your last visit? Include any pap smears or colon screening.  No
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
## 2021-08-05 ENCOUNTER — OFFICE VISIT (OUTPATIENT)
Dept: CARDIOLOGY CLINIC | Age: 31
End: 2021-08-05
Payer: COMMERCIAL

## 2021-08-05 VITALS
OXYGEN SATURATION: 97 % | HEART RATE: 82 BPM | WEIGHT: 249 LBS | DIASTOLIC BLOOD PRESSURE: 75 MMHG | HEIGHT: 69 IN | BODY MASS INDEX: 36.88 KG/M2 | SYSTOLIC BLOOD PRESSURE: 137 MMHG

## 2021-08-05 DIAGNOSIS — R94.31 ABNORMAL EKG: Primary | ICD-10-CM

## 2021-08-05 DIAGNOSIS — E78.5 HYPERLIPIDEMIA, UNSPECIFIED HYPERLIPIDEMIA TYPE: ICD-10-CM

## 2021-08-05 DIAGNOSIS — R07.9 CHEST PAIN, UNSPECIFIED TYPE: ICD-10-CM

## 2021-08-05 DIAGNOSIS — R00.2 PALPITATION: ICD-10-CM

## 2021-08-05 PROCEDURE — 99204 OFFICE O/P NEW MOD 45 MIN: CPT | Performed by: INTERNAL MEDICINE

## 2021-08-05 RX ORDER — LEVONORGESTREL AND ETHINYL ESTRADIOL 0.1-0.02MG
KIT ORAL
COMMUNITY
End: 2021-08-05
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
## 2021-09-21 ENCOUNTER — HOSPITAL ENCOUNTER (OUTPATIENT)
Dept: MAMMOGRAPHY | Age: 31
Discharge: HOME | End: 2021-09-21
Attending: SPECIALIST

## 2021-09-21 ENCOUNTER — TRANSCRIBE ORDER (OUTPATIENT)
Dept: SCHEDULING | Age: 31
End: 2021-09-21

## 2021-09-21 DIAGNOSIS — Z00.6 EXAMINATION OF PARTICIPANT IN CLINICAL TRIAL: ICD-10-CM

## 2021-09-21 DIAGNOSIS — Z00.6 EXAMINATION OF PARTICIPANT IN CLINICAL TRIAL: Primary | ICD-10-CM

## 2021-09-21 PROCEDURE — 77080 DXA BONE DENSITY AXIAL: CPT

## 2021-11-17 ENCOUNTER — OFFICE VISIT (OUTPATIENT)
Dept: CARDIOLOGY CLINIC | Age: 31
End: 2021-11-17
Payer: COMMERCIAL

## 2021-11-17 VITALS
HEART RATE: 98 BPM | OXYGEN SATURATION: 98 % | DIASTOLIC BLOOD PRESSURE: 87 MMHG | BODY MASS INDEX: 35.25 KG/M2 | HEIGHT: 69 IN | WEIGHT: 238 LBS | SYSTOLIC BLOOD PRESSURE: 133 MMHG

## 2021-11-17 DIAGNOSIS — R00.2 PALPITATION: ICD-10-CM

## 2021-11-17 DIAGNOSIS — R07.9 CHEST PAIN, UNSPECIFIED TYPE: ICD-10-CM

## 2021-11-17 DIAGNOSIS — R94.31 ABNORMAL EKG: Primary | ICD-10-CM

## 2021-11-17 DIAGNOSIS — E78.5 HYPERLIPIDEMIA, UNSPECIFIED HYPERLIPIDEMIA TYPE: ICD-10-CM

## 2021-11-17 PROCEDURE — 99214 OFFICE O/P EST MOD 30 MIN: CPT | Performed by: NURSE PRACTITIONER

## 2021-11-17 NOTE — PATIENT INSTRUCTIONS
Learning About the [address] Diet  What is the Mediterranean diet? The Mediterranean diet is a style of eating rather than a diet plan. It features foods eaten in [place], [place], [place] and [place], and other countries along the [facility]. It emphasizes eating foods like fish, fruits, vegetables, beans, high-fiber breads and whole grains, nuts, and olive oil. This style of eating includes limited red meat, cheese, and sweets. Why choose the Mediterranean diet? A Mediterranean-style diet may improve heart health. It contains more fat than other heart-healthy diets. But the fats are mainly from nuts, unsaturated oils (such as fish oils and olive oil), and certain nut or seed oils (such as canola, soybean, or flaxseed oil). These fats may help protect the heart and blood vessels. How can you get started on the Mediterranean diet? Here are some things you can do to switch to a more Mediterranean way of eating. What to eat  · Eat a variety of fruits and vegetables each day, such as grapes, blueberries, tomatoes, broccoli, peppers, figs, olives, spinach, eggplant, beans, lentils, and chickpeas. · Eat a variety of whole-grain foods each day, such as oats, brown rice, and whole wheat bread, pasta, and couscous. · Eat fish at least 2 times a week. Try tuna, salmon, mackerel, lake trout, herring, or sardines. · Eat moderate amounts of low-fat dairy products, such as milk, cheese, or yogurt. · Eat moderate amounts of poultry and eggs. · Choose healthy (unsaturated) fats, such as nuts, olive oil, and certain nut or seed oils like canola, soybean, and flaxseed. · Limit unhealthy (saturated) fats, such as butter, palm oil, and coconut oil. And limit fats found in animal products, such as meat and dairy products made with whole milk. Try to eat red meat only a few times a month in very small amounts. · Limit sweets and desserts to only a few times a week.  This includes sugar-sweetened drinks like soda. The Mediterranean diet may also include red wine with your meal--1 glass each day for women and up to 2 glasses a day for men. Tips for eating at home  · Use herbs, spices, garlic, lemon zest, and citrus juice instead of salt to add flavor to foods. · Add avocado slices to your sandwich instead of buckner. · Have fish for lunch or dinner instead of red meat. Brush the fish with olive oil, and broil or grill it. · Sprinkle your salad with seeds or nuts instead of cheese. · Cook with olive or canola oil instead of butter or oils that are high in saturated fat. · Switch from 2% milk or whole milk to 1% or fat-free milk. · Dip raw vegetables in a vinaigrette dressing or hummus instead of dips made from mayonnaise or sour cream.  · Have a piece of fruit for dessert instead of a piece of cake. Try baked apples, or have some dried fruit. Tips for eating out  · Try broiled, grilled, baked, or poached fish instead of having it fried or breaded. · Ask your  to have your meals prepared with olive oil instead of butter. · Order dishes made with marinara sauce or sauces made from olive oil. Avoid sauces made from cream or mayonnaise. · Choose whole-grain breads, whole wheat pasta and pizza crust, brown rice, beans, and lentils. · Cut back on butter or margarine on bread. Instead, you can dip your bread in a small amount of olive oil. · Ask for a side salad or grilled vegetables instead of french fries or chips. Where can you learn more? Go to http://www.shannon.com/  Enter O407 in the search box to learn more about \"Learning About the Mediterranean Diet. \"  Current as of: December 17, 2020               Content Version: 13.0  © 9950-2025 [employer]. Care instructions adapted under license by [employer] (which disclaims liability or warranty for this information).  If you have questions about a medical condition or this instruction, always ask your healthcare professional. [address] any warranty or liability for your use of this information.

## 2021-11-17 NOTE — PROGRESS NOTES
1. Have you been to the ER, urgent care clinic since your last visit? Hospitalized since your last visit? No    2. Have you seen or consulted any other health care providers outside of the [address] [place] [address] since your last visit? Include any pap smears or colon screening.  No
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
## 2021-12-16 DIAGNOSIS — E55.9 VITAMIN D DEFICIENCY: ICD-10-CM

## 2021-12-16 DIAGNOSIS — Z13.29 SCREENING FOR ENDOCRINE DISORDER: ICD-10-CM

## 2021-12-16 DIAGNOSIS — L55.1 SUNBURN, BLISTERING: ICD-10-CM

## 2021-12-16 DIAGNOSIS — E78.2 MIXED HYPERLIPIDEMIA: ICD-10-CM

## 2021-12-16 DIAGNOSIS — R73.03 PREDIABETES: ICD-10-CM

## 2022-02-21 ENCOUNTER — TRANSCRIBE ORDER (OUTPATIENT)
Dept: SCHEDULING | Age: 32
End: 2022-02-21

## 2022-02-21 DIAGNOSIS — Z00.6 EXAMINATION OF PARTICIPANT IN CLINICAL TRIAL: Primary | ICD-10-CM

## 2022-03-08 ENCOUNTER — HOSPITAL ENCOUNTER (OUTPATIENT)
Dept: MAMMOGRAPHY | Age: 32
Discharge: HOME | End: 2022-03-08
Attending: SPECIALIST

## 2022-03-08 DIAGNOSIS — Z00.6 EXAMINATION OF PARTICIPANT IN CLINICAL TRIAL: ICD-10-CM

## 2022-03-08 PROCEDURE — 77080 DXA BONE DENSITY AXIAL: CPT

## 2022-03-19 PROBLEM — D75.1 POLYCYTHEMIA: Status: ACTIVE | Noted: 2017-12-20

## 2022-03-19 PROBLEM — D75.1 ERYTHROCYTOSIS: Status: ACTIVE | Noted: 2017-12-20

## 2022-03-19 PROBLEM — E66.01 SEVERE OBESITY (BMI 35.0-39.9) WITH COMORBIDITY (HCC): Status: ACTIVE | Noted: 2018-04-27

## 2022-03-19 PROBLEM — Z91.09 ALLERGY TO ENVIRONMENTAL FACTORS: Status: ACTIVE | Noted: 2021-07-02

## 2022-10-14 ENCOUNTER — OFFICE VISIT (OUTPATIENT)
Dept: CARDIOLOGY CLINIC | Age: 32
End: 2022-10-14
Payer: COMMERCIAL

## 2022-10-14 VITALS
HEIGHT: 68 IN | WEIGHT: 286 LBS | BODY MASS INDEX: 43.35 KG/M2 | SYSTOLIC BLOOD PRESSURE: 141 MMHG | DIASTOLIC BLOOD PRESSURE: 73 MMHG | HEART RATE: 70 BPM

## 2022-10-14 DIAGNOSIS — R00.2 PALPITATION: ICD-10-CM

## 2022-10-14 DIAGNOSIS — E78.5 HYPERLIPIDEMIA, UNSPECIFIED HYPERLIPIDEMIA TYPE: ICD-10-CM

## 2022-10-14 DIAGNOSIS — I10 PRIMARY HYPERTENSION: ICD-10-CM

## 2022-10-14 DIAGNOSIS — I27.20 PULMONARY HYPERTENSION (HCC): ICD-10-CM

## 2022-10-14 DIAGNOSIS — R07.9 CHEST PAIN, UNSPECIFIED TYPE: Primary | ICD-10-CM

## 2022-10-14 PROCEDURE — 99214 OFFICE O/P EST MOD 30 MIN: CPT | Performed by: INTERNAL MEDICINE

## 2022-10-14 RX ORDER — LOSARTAN POTASSIUM 50 MG/1
TABLET ORAL DAILY
COMMUNITY

## 2022-10-14 RX ORDER — PANTOPRAZOLE SODIUM 20 MG/1
20 TABLET, DELAYED RELEASE ORAL DAILY
Qty: 90 TABLET | Refills: 1 | Status: SHIPPED | OUTPATIENT
Start: 2022-10-14

## 2022-10-14 NOTE — PROGRESS NOTES
1. Have you been to the ER, urgent care clinic since your last visit? Hospitalized since your last visit?     no    2. Have you seen or consulted any other health care providers outside of the [address] [place] [address] since your last visit? Include any pap smears or colon screening.       No     \
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
## 2023-01-17 ENCOUNTER — TRANSCRIBE ORDER (OUTPATIENT)
Dept: SCHEDULING | Age: 33
End: 2023-01-17

## 2023-01-17 DIAGNOSIS — E04.1 THYROID NODULE: Primary | ICD-10-CM

## 2023-01-19 ENCOUNTER — HOSPITAL ENCOUNTER (OUTPATIENT)
Dept: ULTRASOUND IMAGING | Age: 33
Discharge: HOME | End: 2023-01-19
Attending: INTERNAL MEDICINE
Payer: COMMERCIAL

## 2023-01-19 ENCOUNTER — HOSPITAL ENCOUNTER (OUTPATIENT)
Dept: LAB | Age: 33
Discharge: HOME | End: 2023-01-19

## 2023-01-19 DIAGNOSIS — E04.1 THYROID NODULE: ICD-10-CM

## 2023-01-19 LAB — XX-LABCORP SPECIMEN COL,LCBCF: NORMAL

## 2023-01-19 PROCEDURE — 99001 SPECIMEN HANDLING PT-LAB: CPT

## 2023-01-19 PROCEDURE — 76536 US EXAM OF HEAD AND NECK: CPT

## 2023-02-01 DIAGNOSIS — E04.1 THYROID NODULE: Primary | ICD-10-CM

## 2023-02-05 DIAGNOSIS — E04.1 THYROID NODULE: Primary | ICD-10-CM

## 2023-08-09 ENCOUNTER — OFFICE VISIT (OUTPATIENT)
Age: 33
End: 2023-08-09
Payer: COMMERCIAL

## 2023-08-09 VITALS
HEIGHT: 69 IN | WEIGHT: 276 LBS | SYSTOLIC BLOOD PRESSURE: 139 MMHG | HEART RATE: 65 BPM | DIASTOLIC BLOOD PRESSURE: 69 MMHG | OXYGEN SATURATION: 99 % | BODY MASS INDEX: 40.88 KG/M2

## 2023-08-09 DIAGNOSIS — R00.2 PALPITATIONS: Primary | ICD-10-CM

## 2023-08-09 DIAGNOSIS — E78.2 MIXED HYPERLIPIDEMIA: ICD-10-CM

## 2023-08-09 DIAGNOSIS — I10 ESSENTIAL (PRIMARY) HYPERTENSION: ICD-10-CM

## 2023-08-09 DIAGNOSIS — I27.20 PULMONARY HYPERTENSION, UNSPECIFIED (HCC): ICD-10-CM

## 2023-08-09 PROCEDURE — G8417 CALC BMI ABV UP PARAM F/U: HCPCS | Performed by: NURSE PRACTITIONER

## 2023-08-09 PROCEDURE — 3074F SYST BP LT 130 MM HG: CPT | Performed by: NURSE PRACTITIONER

## 2023-08-09 PROCEDURE — 3078F DIAST BP <80 MM HG: CPT | Performed by: NURSE PRACTITIONER

## 2023-08-09 PROCEDURE — 99214 OFFICE O/P EST MOD 30 MIN: CPT | Performed by: NURSE PRACTITIONER

## 2023-08-09 PROCEDURE — G8428 CUR MEDS NOT DOCUMENT: HCPCS | Performed by: NURSE PRACTITIONER

## 2023-08-09 PROCEDURE — 1036F TOBACCO NON-USER: CPT | Performed by: NURSE PRACTITIONER

## 2023-08-09 RX ORDER — LABETALOL 100 MG/1
100 TABLET, FILM COATED ORAL EVERY 12 HOURS
COMMUNITY
Start: 2023-06-26

## 2023-08-09 RX ORDER — THIAMINE HCL 100 MG
30 TABLET ORAL DAILY
COMMUNITY

## 2023-08-09 RX ORDER — POTASSIUM CHLORIDE 750 MG/1
10 TABLET, EXTENDED RELEASE ORAL DAILY
COMMUNITY

## 2023-08-09 RX ORDER — EPINEPHRINE 0.3 MG/.3ML
0.3 INJECTION SUBCUTANEOUS PRN
COMMUNITY
Start: 2021-07-03

## 2023-08-09 RX ORDER — ERGOCALCIFEROL 1.25 MG/1
CAPSULE ORAL
COMMUNITY
Start: 2023-08-01

## 2023-08-09 RX ORDER — CINNAMON BARK
1000 POWDER (GRAM) MISCELLANEOUS DAILY
COMMUNITY

## 2023-08-09 ASSESSMENT — PATIENT HEALTH QUESTIONNAIRE - PHQ9
DEPRESSION UNABLE TO ASSESS: FUNCTIONAL CAPACITY MOTIVATION LIMITS ACCURACY
SUM OF ALL RESPONSES TO PHQ QUESTIONS 1-9: 0
1. LITTLE INTEREST OR PLEASURE IN DOING THINGS: 0
2. FEELING DOWN, DEPRESSED OR HOPELESS: 0
SUM OF ALL RESPONSES TO PHQ9 QUESTIONS 1 & 2: 0
SUM OF ALL RESPONSES TO PHQ QUESTIONS 1-9: 0

## 2023-08-09 NOTE — PROGRESS NOTES
complete with contrast, bubble, strain, and 3D PRN    External Referral To Sleep Medicine     Likely group 3. Continue dietary modification and exercise. Clinically no suspicion of sleep apnea, will repeat echocardiogram and referred for sleep study        4. Essential (primary) hypertension      Continue treatment monitor          Medications Discontinued During This Encounter   Medication Reason    losartan (COZAAR) 50 MG tablet Therapy completed    Multiple Vitamin (MULTI-VITAMIN PO) Therapy completed    pantoprazole (PROTONIX) 20 MG tablet Therapy completed       [unfilled]    Follow-up and Dispositions    Return in about 1 month (around 9/9/2023) for Follow up with Dr. Ronda Oh. 8/2021  Seen after recent ER visit abnormal EKG atypical chest pain. Palpitations are stable clinically pain appears noncardiac. Set up for testing  11/2021  Patient denies recurrent chest pain. JOAQUIN negative for ischemia, echo with normal LV function, mild MR, Right Ventricular Arterial Pressure (RVSP) is 44 mmHg. Pulmonary hypertension found to be mild. Recommend to monitor b/p - encouraged diet and exercise for continued weight loss. 10/2022  Atypical chest pain negative stress test monitor clinically mild pulmonary hypertension likely group 3. Continue diet modification and exercise monitor  8/2023  Patient seen in follow-up for ER visit for shortness of breath she continues to have shortness of breath with headaches and palpitations at times.   Prior echo with mild pulmonary hypertension likely group 3, will repeat echocardiogram and refer for sleep study

## 2023-09-06 ENCOUNTER — OFFICE VISIT (OUTPATIENT)
Age: 33
End: 2023-09-06
Payer: COMMERCIAL

## 2023-09-06 VITALS
SYSTOLIC BLOOD PRESSURE: 139 MMHG | DIASTOLIC BLOOD PRESSURE: 65 MMHG | WEIGHT: 270 LBS | HEART RATE: 70 BPM | HEIGHT: 68 IN | OXYGEN SATURATION: 98 % | BODY MASS INDEX: 40.92 KG/M2

## 2023-09-06 DIAGNOSIS — E78.5 HYPERLIPIDEMIA, UNSPECIFIED HYPERLIPIDEMIA TYPE: ICD-10-CM

## 2023-09-06 DIAGNOSIS — I10 ESSENTIAL (PRIMARY) HYPERTENSION: Primary | ICD-10-CM

## 2023-09-06 DIAGNOSIS — I27.20 PULMONARY HYPERTENSION, UNSPECIFIED (HCC): ICD-10-CM

## 2023-09-06 PROCEDURE — G8428 CUR MEDS NOT DOCUMENT: HCPCS | Performed by: INTERNAL MEDICINE

## 2023-09-06 PROCEDURE — G8417 CALC BMI ABV UP PARAM F/U: HCPCS | Performed by: INTERNAL MEDICINE

## 2023-09-06 PROCEDURE — 3075F SYST BP GE 130 - 139MM HG: CPT | Performed by: INTERNAL MEDICINE

## 2023-09-06 PROCEDURE — 3078F DIAST BP <80 MM HG: CPT | Performed by: INTERNAL MEDICINE

## 2023-09-06 PROCEDURE — 1036F TOBACCO NON-USER: CPT | Performed by: INTERNAL MEDICINE

## 2023-09-06 PROCEDURE — 99213 OFFICE O/P EST LOW 20 MIN: CPT | Performed by: INTERNAL MEDICINE

## 2023-09-06 RX ORDER — NAPROXEN 500 MG/1
TABLET ORAL
COMMUNITY
Start: 2023-08-22

## 2023-09-06 ASSESSMENT — PATIENT HEALTH QUESTIONNAIRE - PHQ9
SUM OF ALL RESPONSES TO PHQ QUESTIONS 1-9: 0
2. FEELING DOWN, DEPRESSED OR HOPELESS: 0
SUM OF ALL RESPONSES TO PHQ QUESTIONS 1-9: 0
SUM OF ALL RESPONSES TO PHQ9 QUESTIONS 1 & 2: 0
SUM OF ALL RESPONSES TO PHQ QUESTIONS 1-9: 0
1. LITTLE INTEREST OR PLEASURE IN DOING THINGS: 0
SUM OF ALL RESPONSES TO PHQ QUESTIONS 1-9: 0
DEPRESSION UNABLE TO ASSESS: FUNCTIONAL CAPACITY MOTIVATION LIMITS ACCURACY

## 2023-09-06 NOTE — PROGRESS NOTES
1. Have you been to the ER, urgent care clinic since your last visit? Hospitalized since your last visit? No    2. Have you seen or consulted any other health care providers outside of the [address] [place] [address] since your last visit? Include any pap smears or colon screening.       No

## 2023-09-06 NOTE — PROGRESS NOTES
HISTORY OF PRESENT ILLNESS  Jori Eloy is a 28 y.o. female. 8/5/2021  Patient seen today for new patient evaluation. She is here for evaluation of palpitation and abnormal EKG. Patient was recently in emergency room with symptom of  Was initially in emergency room with anaphylaxis after bee sting. Subsequently started having chest pain and went to emergency room again  Time of Arrival: 07/12/21 1842    (R07.89) Atypical chest pain    (E78.5) Hyperlipidemia, unspecified hyperlipidemia type    (R00.2) Palpitations    (Z92.29) History of hormone therapy  Initial work-up in the emergency room was unremarkable    11/2021  Patient presents to f/u for palpitations and stress test results. She denies chest pain, SOB or edema. She c/o mild elevated heart rate due to prednisone for hives. 8/2023  Patient seen for complaint of palpitations headache, shortness of breath. She denies chest pain. She also complains of GERD and is awaiting appointment for GI. Review of Systems   Constitutional:  Negative for chills and fever. HENT:  Negative for nosebleeds. Eyes:  Negative for blurred vision and double vision. Respiratory: Positive for shortness of breath negative for cough, hemoptysis, sputum production, and wheezing. Cardiovascular:  Negative for chest pain, palpitations, orthopnea, claudication, leg swelling and PND. Gastrointestinal:  Negative for abdominal pain, heartburn, nausea and vomiting. Musculoskeletal:  Negative for myalgias. Skin:  Negative for rash. Neurological:  Negative for dizziness, weakness and headaches. Endo/Heme/Allergies:  Does not bruise/bleed easily.    Family History   Problem Relation Age of Onset    Hypertension Father     Allergic Rhinitis Mother         hayfever    Hypertension Mother     Elevated Lipids Mother     Stroke Paternal Grandfather     Cancer Paternal Grandmother         rare blood cancer    Cancer Maternal Grandmother         cervical cancer

## 2024-02-05 ENCOUNTER — OFFICE VISIT (OUTPATIENT)
Age: 34
End: 2024-02-05
Payer: COMMERCIAL

## 2024-02-05 VITALS
WEIGHT: 274 LBS | BODY MASS INDEX: 41.52 KG/M2 | HEART RATE: 68 BPM | DIASTOLIC BLOOD PRESSURE: 66 MMHG | HEIGHT: 68 IN | SYSTOLIC BLOOD PRESSURE: 130 MMHG

## 2024-02-05 DIAGNOSIS — E78.5 HYPERLIPIDEMIA, UNSPECIFIED HYPERLIPIDEMIA TYPE: ICD-10-CM

## 2024-02-05 DIAGNOSIS — Z01.810 PREOP CARDIOVASCULAR EXAM: ICD-10-CM

## 2024-02-05 DIAGNOSIS — I10 ESSENTIAL (PRIMARY) HYPERTENSION: Primary | ICD-10-CM

## 2024-02-05 PROCEDURE — G8484 FLU IMMUNIZE NO ADMIN: HCPCS | Performed by: INTERNAL MEDICINE

## 2024-02-05 PROCEDURE — 3075F SYST BP GE 130 - 139MM HG: CPT | Performed by: INTERNAL MEDICINE

## 2024-02-05 PROCEDURE — G8417 CALC BMI ABV UP PARAM F/U: HCPCS | Performed by: INTERNAL MEDICINE

## 2024-02-05 PROCEDURE — 99213 OFFICE O/P EST LOW 20 MIN: CPT | Performed by: INTERNAL MEDICINE

## 2024-02-05 PROCEDURE — 3078F DIAST BP <80 MM HG: CPT | Performed by: INTERNAL MEDICINE

## 2024-02-05 PROCEDURE — G8428 CUR MEDS NOT DOCUMENT: HCPCS | Performed by: INTERNAL MEDICINE

## 2024-02-05 PROCEDURE — 1036F TOBACCO NON-USER: CPT | Performed by: INTERNAL MEDICINE

## 2024-02-05 RX ORDER — LABETALOL 100 MG/1
100 TABLET, FILM COATED ORAL 3 TIMES DAILY
Qty: 270 TABLET | Refills: 3 | Status: SHIPPED | OUTPATIENT
Start: 2024-02-05

## 2024-02-05 RX ORDER — ELECTROLYTES/DEXTROSE
SOLUTION, ORAL ORAL DAILY
COMMUNITY

## 2024-02-05 RX ORDER — APIXABAN 5 MG/1
1 TABLET, FILM COATED ORAL 2 TIMES DAILY
COMMUNITY
Start: 2024-02-02

## 2024-02-05 ASSESSMENT — PATIENT HEALTH QUESTIONNAIRE - PHQ9
SUM OF ALL RESPONSES TO PHQ9 QUESTIONS 1 & 2: 0
1. LITTLE INTEREST OR PLEASURE IN DOING THINGS: 0
2. FEELING DOWN, DEPRESSED OR HOPELESS: 0
SUM OF ALL RESPONSES TO PHQ QUESTIONS 1-9: 0
SUM OF ALL RESPONSES TO PHQ QUESTIONS 1-9: 0
DEPRESSION UNABLE TO ASSESS: FUNCTIONAL CAPACITY MOTIVATION LIMITS ACCURACY
SUM OF ALL RESPONSES TO PHQ QUESTIONS 1-9: 0
SUM OF ALL RESPONSES TO PHQ QUESTIONS 1-9: 0

## 2024-02-05 NOTE — PROGRESS NOTES
1. Have you been to the ER, urgent care clinic since your last visit?  Hospitalized since your last visit?     No    2. Have you seen or consulted any other health care providers outside of the [facility] since your last visit?  Include any pap smears or colon screening.      No

## 2024-02-05 NOTE — PROGRESS NOTES
HISTORY OF PRESENT ILLNESS  Diane Hart is a 32 y.o. female.    8/5/2021  Patient seen today for new patient evaluation.  She is here for evaluation of palpitation and abnormal EKG.  Patient was recently in emergency room with symptom of  Was initially in emergency room with anaphylaxis after bee sting.  Subsequently started having chest pain and went to emergency room again  Time of Arrival: 07/12/21 1842    (R07.89) Atypical chest pain    (E78.5) Hyperlipidemia, unspecified hyperlipidemia type    (R00.2) Palpitations    (Z92.29) History of hormone therapy  Initial work-up in the emergency room was unremarkable    11/2021  Patient presents to f/u for palpitations and stress test results. She denies chest pain, SOB or edema.  She c/o mild elevated heart rate due to prednisone for hives.    8/2023  Patient seen for complaint of palpitations headache, shortness of breath.  She denies chest pain.  She also complains of GERD and is awaiting appointment for GI.      Review of Systems   Constitutional:  Negative for chills and fever.   HENT:  Negative for nosebleeds.    Eyes:  Negative for blurred vision and double vision.   Respiratory: Positive for shortness of breath negative for cough, hemoptysis, sputum production, and wheezing.    Cardiovascular:  Negative for chest pain, palpitations, orthopnea, claudication, leg swelling and PND.   Gastrointestinal:  Negative for abdominal pain, heartburn, nausea and vomiting.   Musculoskeletal:  Negative for myalgias.   Skin:  Negative for rash.   Neurological:  Negative for dizziness, weakness and headaches.   Endo/Heme/Allergies:  Does not bruise/bleed easily.   Family History   Problem Relation Age of Onset    Hypertension Father     Allergic Rhinitis Mother         hayfever    Hypertension Mother     Elevated Lipids Mother     Stroke Paternal Grandfather     Cancer Paternal Grandmother         rare blood cancer    Cancer Maternal Grandmother         cervical cancer